# Patient Record
Sex: FEMALE | Race: WHITE | NOT HISPANIC OR LATINO | Employment: FULL TIME | URBAN - METROPOLITAN AREA
[De-identification: names, ages, dates, MRNs, and addresses within clinical notes are randomized per-mention and may not be internally consistent; named-entity substitution may affect disease eponyms.]

---

## 2017-03-29 DIAGNOSIS — I10 ESSENTIAL HYPERTENSION WITH GOAL BLOOD PRESSURE LESS THAN 140/90: ICD-10-CM

## 2017-03-29 RX ORDER — LISINOPRIL 10 MG/1
10 TABLET ORAL DAILY
Qty: 90 TABLET | Refills: 1 | Status: SHIPPED | OUTPATIENT
Start: 2017-03-29 | End: 2017-09-24

## 2017-04-04 ENCOUNTER — MYC MEDICAL ADVICE (OUTPATIENT)
Dept: FAMILY MEDICINE | Facility: CLINIC | Age: 57
End: 2017-04-04

## 2017-05-02 ENCOUNTER — OFFICE VISIT (OUTPATIENT)
Dept: FAMILY MEDICINE | Facility: CLINIC | Age: 57
End: 2017-05-02
Payer: COMMERCIAL

## 2017-05-02 ENCOUNTER — OFFICE VISIT (OUTPATIENT)
Dept: OPTOMETRY | Facility: CLINIC | Age: 57
End: 2017-05-02
Payer: COMMERCIAL

## 2017-05-02 VITALS
SYSTOLIC BLOOD PRESSURE: 165 MMHG | TEMPERATURE: 97.5 F | HEART RATE: 76 BPM | HEIGHT: 64 IN | WEIGHT: 205 LBS | BODY MASS INDEX: 35 KG/M2 | DIASTOLIC BLOOD PRESSURE: 79 MMHG

## 2017-05-02 DIAGNOSIS — H57.11 PAIN AROUND RIGHT EYE: Primary | ICD-10-CM

## 2017-05-02 DIAGNOSIS — H04.123 DRY EYES: ICD-10-CM

## 2017-05-02 DIAGNOSIS — H15.102 EPISCLERITIS OF LEFT EYE: Primary | ICD-10-CM

## 2017-05-02 PROCEDURE — 99203 OFFICE O/P NEW LOW 30 MIN: CPT | Performed by: OPTOMETRIST

## 2017-05-02 PROCEDURE — 99213 OFFICE O/P EST LOW 20 MIN: CPT | Performed by: FAMILY MEDICINE

## 2017-05-02 RX ORDER — PREDNISOLONE ACETATE 10 MG/ML
1 SUSPENSION/ DROPS OPHTHALMIC 4 TIMES DAILY
Qty: 1 BOTTLE | Refills: 0 | Status: SHIPPED | OUTPATIENT
Start: 2017-05-02 | End: 2017-06-07

## 2017-05-02 RX ORDER — PREDNISOLONE ACETATE 10 MG/ML
1 SUSPENSION/ DROPS OPHTHALMIC 4 TIMES DAILY
Qty: 1 BOTTLE | Refills: 0 | Status: SHIPPED | OUTPATIENT
Start: 2017-05-02 | End: 2017-05-02

## 2017-05-02 ASSESSMENT — VISUAL ACUITY
OD_SC: 20/30
OS_SC+: -2
OD_SC+: -1
OS_SC: 20/30
METHOD: SNELLEN - LINEAR

## 2017-05-02 ASSESSMENT — TONOMETRY
OD_IOP_MMHG: 17
OS_IOP_MMHG: 14
IOP_METHOD: APPLANATION

## 2017-05-02 ASSESSMENT — EXTERNAL EXAM - LEFT EYE: OS_EXAM: NORMAL

## 2017-05-02 ASSESSMENT — EXTERNAL EXAM - RIGHT EYE: OD_EXAM: NORMAL

## 2017-05-02 ASSESSMENT — SLIT LAMP EXAM - LIDS: COMMENTS: NORMAL

## 2017-05-02 NOTE — MR AVS SNAPSHOT
After Visit Summary   5/2/2017    Josephine Heard    MRN: [id number]           Patient Information     Date Of Birth          1960        Visit Information        Provider Department      5/2/2017 11:30 AM Michelle Fulton OD [facility]        Today's Diagnoses     Episcleritis of left eye    -  1      Care Instructions    Patient was advised of today's exam findings.  Use prescription drops four times a day for 7 days in left eye  Use artificial tears Systane Ultra twice a day   Return to clinic 1 week or as needed     Michelle Fulton O.D.  [facility]   [address] [place] [address]  [phone]          Follow-ups after your visit        Who to contact     If you have questions or need follow up information about today's clinic visit or your schedule please contact [facility] directly at [phone].  Normal or non-critical lab and imaging results will be communicated to you by MyChart, letter or phone within 4 business days after the clinic has received the results. If you do not hear from us within 7 days, please contact the clinic through MyChart or phone. If you have a critical or abnormal lab result, we will notify you by phone as soon as possible.  Submit refill requests through needmade or call your pharmacy and they will forward the refill request to us. Please allow 3 business days for your refill to be completed.          Additional Information About Your Visit        MyChart Information     needmade gives you secure access to your electronic health record. If you see a primary care provider, you can also send messages to your care team and make appointments. If you have questions, please call your primary care clinic.  If you do not have a primary care provider, please call [phone] and they will assist you.        Care EveryWhere ID     This is your Care EveryWhere ID. This could be used by other organizations to access your  [place] medical records  [id number]         Blood Pressure from Last 3 Encounters:   05/02/17 165/79   09/12/16 139/88   02/26/16 134/82    Weight from Last 3 Encounters:   05/02/17 93 kg (205 lb)   09/12/16 93.9 kg (207 lb)   02/26/16 93.9 kg (207 lb)              Today, you had the following     No orders found for display         Today's Medication Changes          These changes are accurate as of: 5/2/17 12:19 PM.  If you have any questions, ask your nurse or doctor.               Start taking these medicines.        Dose/Directions    prednisoLONE acetate 1 % ophthalmic susp   Commonly known as:  PRED FORTE   Used for:  Episcleritis of left eye   Started by:  Michelle Fulton, OD        Dose:  1 drop   Place 1 drop Into the left eye 4 times daily for 7 days   Quantity:  1 Bottle   Refills:  0            Where to get your medicines      Some of these will need a paper prescription and others can be bought over the counter.  Ask your nurse if you have questions.     Bring a paper prescription for each of these medications     prednisoLONE acetate 1 % ophthalmic susp                Primary Care Provider Office Phone # Fax #    Greer Cody [phone]       [facility] [address] [facility] [address]        Thank you!     Thank you for choosing [facility]  for your care. Our goal is always to provide you with excellent care. Hearing back from our patients is one way we can continue to improve our services. Please take a few minutes to complete the written survey that you may receive in the mail after your visit with us. Thank you!             Your Updated Medication List - Protect others around you: Learn how to safely use, store and throw away your medicines at www.disposemymeds.org.          This list is accurate as of: 5/2/17 12:19 PM.  Always use your most recent med list.                   Brand Name Dispense Instructions for use    ALPRAZolam 0.25 MG  tablet    XANAX     1 TABLET 3 TIMES DAILY as needed       aspirin 81 MG tablet      1 TABLET DAILY       Black Cohosh 40 MG Caps      Take  by mouth.       CALCIUM 600 + D PO      ONCE A DAY       cetirizine 10 MG tablet    zyrTEC     Take 10 mg by mouth daily       EVENING PRIMROSE OIL PO      Take  by mouth.       FISH OIL      ONCE A DAY       fluticasone 50 MCG/ACT spray    FLONASE    3 Package    Spray 1-2 sprays into both nostrils daily       hyoscyamine 0.125 MG tablet    ANASPAZ/LEVSIN    40 tablet    Take 1-2 tablets by mouth every 4 hours as needed for cramping.       ibuprofen 200 MG tablet    ADVIL/MOTRIN     1-2 TABLETS on as needed basis       lisinopril 10 MG tablet    PRINIVIL/ZESTRIL    90 tablet    Take 1 tablet (10 mg) by mouth daily       MECLIZINE HCL PO      AS NEEDED       MIRALAX PO      AS NEEDED       olopatadine 0.1 % ophthalmic solution    PATANOL    5 mL    Place 1 drop into both eyes 2 times daily       omeprazole 20 MG CR capsule    priLOSEC    180 capsule    Take 1 capsule (20 mg) by mouth 2 times daily       prednisoLONE acetate 1 % ophthalmic susp    PRED FORTE    1 Bottle    Place 1 drop Into the left eye 4 times daily for 7 days       * triamcinolone 0.1 % cream    KENALOG    15 g    Apply sparingly to affected area three times daily for 14 days.       * triamcinolone 0.1 % cream    KENALOG    30 g    Apply sparingly to face bid times daily for 14 days.       VITAMIN D (CHOLECALCIFEROL) PO      Take 1,000 Units by mouth daily       * Notice:  This list has 2 medication(s) that are the same as other medications prescribed for you. Read the directions carefully, and ask your doctor or other care provider to review them with you.

## 2017-05-02 NOTE — PROGRESS NOTES
"  SUBJECTIVE:                                                    Josephine Heard is a 56 year old female who presents to clinic today for the following health issues:        Left eye is red and sore, watery started yesterday.    Pt with blowing leaves on Saturday.  48 hours later left eye started getting painful,   Feels like pressure in eye  No discharge.   Has tried multiple eye drops.   Vision is a bit blury.    Hurts with looking to the left.     Right eye is fine  Given nature of symptoms, pt referred to optho for further evaluation    Problem list and histories reviewed & adjusted, as indicated.  Additional history: as documented    Labs reviewed in EPIC    Reviewed and updated as needed this visit by clinical staff  Tobacco  Allergies  Med Hx  Surg Hx  Fam Hx  Soc Hx      Reviewed and updated as needed this visit by Provider         ROS:  Constitutional, HEENT, cardiovascular, pulmonary, gi and gu systems are negative, except as otherwise noted.    OBJECTIVE:                                                    /79  Pulse 76  Temp 97.5  F (36.4  C) (Oral)  Ht 5' 4\" (1.626 m)  Wt 205 lb (93 kg)  BMI 35.19 kg/m2  Body mass index is 35.19 kg/(m^2).  GENERAL: healthy, alert and no distress  EYES: Eyes grossly normal to inspection, PERRL, EOMI and conjunctiva/corneas- conjunctival injection OD    Diagnostic Test Results:  none      ASSESSMENT/PLAN:                                                            1. Pain around right eye  As above, to see optho today          Greer Herrmann MD  [facility]    "

## 2017-05-02 NOTE — PROGRESS NOTES
5/2/2017 4:33 PM resent prescription since original was local print by mistake.  Michelle Fulton OD

## 2017-05-02 NOTE — MR AVS SNAPSHOT
"              After Visit Summary   5/2/2017    Josephine Heard    MRN: [id number]           Patient Information     Date Of Birth          1960        Visit Information        Provider Department      5/2/2017 11:00 AM Greer Cody MD [facility]        Today's Diagnoses     Pain around right eye    -  1       Follow-ups after your visit        Who to contact     If you have questions or need follow up information about today's clinic visit or your schedule please contact [facility] directly at [phone].  Normal or non-critical lab and imaging results will be communicated to you by MyChart, letter or phone within 4 business days after the clinic has received the results. If you do not hear from us within 7 days, please contact the clinic through JumpSoftt or phone. If you have a critical or abnormal lab result, we will notify you by phone as soon as possible.  Submit refill requests through FreshPay or call your pharmacy and they will forward the refill request to us. Please allow 3 business days for your refill to be completed.          Additional Information About Your Visit        MyChart Information     FreshPay gives you secure access to your electronic health record. If you see a primary care provider, you can also send messages to your care team and make appointments. If you have questions, please call your primary care clinic.  If you do not have a primary care provider, please call [phone] and they will assist you.        Care EveryWhere ID     This is your Care EveryWhere ID. This could be used by other organizations to access your [place] medical records  [id number]        Your Vitals Were     Pulse Temperature Height BMI (Body Mass Index)          76 97.5  F (36.4  C) (Oral) 5' 4\" (1.626 m) 35.19 kg/m2         Blood Pressure from Last 3 Encounters:   05/02/17 165/79   09/12/16 139/88   02/26/16 134/82    Weight from Last 3 Encounters:   05/02/17 205 lb (93 kg) "   09/12/16 207 lb (93.9 kg)   02/26/16 207 lb (93.9 kg)              Today, you had the following     No orders found for display         Today's Medication Changes          These changes are accurate as of: 5/2/17 10:10 PM.  If you have any questions, ask your nurse or doctor.               Start taking these medicines.        Dose/Directions    polyethylene glycol 0.4%- propylene glycol 0.3% 0.4-0.3 % Soln ophthalmic solution   Commonly known as:  SYSTANE ULTRA   Used for:  Dry eyes   Started by:  Michelle Fulton, OD        Dose:  1 drop   Place 1 drop into both eyes 2 times daily   Refills:  0       prednisoLONE acetate 1 % ophthalmic susp   Commonly known as:  PRED FORTE   Used for:  Episcleritis of left eye   Started by:  Michelle Fulton, OD        Dose:  1 drop   Place 1 drop Into the left eye 4 times daily   Quantity:  1 Bottle   Refills:  0            Where to get your medicines      These medications were sent to [facility] [address] [place] [address] [address] [facility] [address] [address]     Phone:  [phone]     prednisoLONE acetate 1 % ophthalmic susp         Some of these will need a paper prescription and others can be bought over the counter.  Ask your nurse if you have questions.     You don't need a prescription for these medications     polyethylene glycol 0.4%- propylene glycol 0.3% 0.4-0.3 % Soln ophthalmic solution                Primary Care Provider Office Phone # Fax #    Greer Cody [phone]       [facility] [address] [facility] [address]        Thank you!     Thank you for choosing [facility]  for your care. Our goal is always to provide you with excellent care. Hearing back from our patients is one way we can continue to improve our services. Please take a few minutes to complete the written survey that you may receive in the mail after your visit with us.  Thank you!             Your Updated Medication List - Protect others around you: Learn how to safely use, store and throw away your medicines at www.disposemymeds.org.          This list is accurate as of: 5/2/17 10:10 PM.  Always use your most recent med list.                   Brand Name Dispense Instructions for use    ALPRAZolam 0.25 MG tablet    XANAX     1 TABLET 3 TIMES DAILY as needed       aspirin 81 MG tablet      1 TABLET DAILY       Black Cohosh 40 MG Caps      Take  by mouth.       CALCIUM 600 + D PO      ONCE A DAY       cetirizine 10 MG tablet    zyrTEC     Take 10 mg by mouth daily       EVENING PRIMROSE OIL PO      Take  by mouth.       FISH OIL      ONCE A DAY       fluticasone 50 MCG/ACT spray    FLONASE    3 Package    Spray 1-2 sprays into both nostrils daily       hyoscyamine 0.125 MG tablet    ANASPAZ/LEVSIN    40 tablet    Take 1-2 tablets by mouth every 4 hours as needed for cramping.       ibuprofen 200 MG tablet    ADVIL/MOTRIN     1-2 TABLETS on as needed basis       lisinopril 10 MG tablet    PRINIVIL/ZESTRIL    90 tablet    Take 1 tablet (10 mg) by mouth daily       MECLIZINE HCL PO      AS NEEDED       MIRALAX PO      AS NEEDED       olopatadine 0.1 % ophthalmic solution    PATANOL    5 mL    Place 1 drop into both eyes 2 times daily       omeprazole 20 MG CR capsule    priLOSEC    180 capsule    Take 1 capsule (20 mg) by mouth 2 times daily       polyethylene glycol 0.4%- propylene glycol 0.3% 0.4-0.3 % Soln ophthalmic solution    SYSTANE ULTRA     Place 1 drop into both eyes 2 times daily       prednisoLONE acetate 1 % ophthalmic susp    PRED FORTE    1 Bottle    Place 1 drop Into the left eye 4 times daily       * triamcinolone 0.1 % cream    KENALOG    15 g    Apply sparingly to affected area three times daily for 14 days.       * triamcinolone 0.1 % cream    KENALOG    30 g    Apply sparingly to face bid times daily for 14 days.       VITAMIN D (CHOLECALCIFEROL) PO      Take 1,000  Units by mouth daily       * Notice:  This list has 2 medication(s) that are the same as other medications prescribed for you. Read the directions carefully, and ask your doctor or other care provider to review them with you.

## 2017-05-02 NOTE — NURSING NOTE
"Chief Complaint   Patient presents with     Eye Problem       Initial /79  Pulse 76  Temp 97.5  F (36.4  C) (Oral)  Ht 5' 4\" (1.626 m)  Wt 205 lb (93 kg)  BMI 35.19 kg/m2 Estimated body mass index is 35.19 kg/(m^2) as calculated from the following:    Height as of this encounter: 5' 4\" (1.626 m).    Weight as of this encounter: 205 lb (93 kg).  Medication Reconciliation: complete   Tatiana Rivas MA      "

## 2017-05-02 NOTE — PROGRESS NOTES
Chief Complaint   Patient presents with     Eye Problem Left Eye     eye pain, per Dr Cody        HPI    Affected eye(s):  Left   Symptoms:     Redness   No foreign body sensation   No photophobia      Duration:  2 days   Frequency:  Constant       Do you have eye pain now?:  Yes   Location:  OS   Pain Level:  No Pain (1)   Pain Duration:  2 days   Other:  dull ache      Comments:  Patient was just seen in family practice for what she thought was pink eye. Dr Cody wanted her to be seen in the eye dept for eye pain and pressure behind her eye.   Her left  eye is sore, and that when she turns her eye to the left it's more sore. Feels pressure in back of eye when touching the left eye  She does not wear contact lenses, and she did put some Visine in her eye about an hour ago- took some of redness away. She said that she also used an [place]away allergy drop . This morning whole eye was red before Visine use, yesterday just the nasal corner    Sinus issues tend to be on left side, has history of blockage of left lower puncta     Using Claritin for Spring allergies           \      HPI and ROS performed by Michelle Fulton, OD  scribed by Chastity Chow Optometric Assistant       See Review Of Systems     Medical, surgical and family histories reviewed and updated 5/2/2017.         OBJECTIVE: See Ophthalmology exam    ASSESSMENT:    ICD-10-CM    1. Episcleritis of left eye H15.102 prednisoLONE acetate (PRED FORTE) 1 % ophthalmic susp   2. Dry eyes H04.123 polyethylene glycol 0.4%- propylene glycol 0.3% (SYSTANE ULTRA) 0.4-0.3 % SOLN ophthalmic solution      PLAN:    Patient Instructions   Patient was advised of today's exam findings.  Use prescription drops four times a day for 7 days in left eye  Use artificial tears Systane Ultra twice a day   Return to clinic 1 week or as needed     Michelle Fulton O.D.  [facility]   [id number] [place] [id number]  [phone]

## 2017-05-02 NOTE — PATIENT INSTRUCTIONS
Patient was advised of today's exam findings.  Use prescription drops four times a day for 7 days in left eye  Use artificial tears Systane Ultra twice a day   Return to clinic 1 week or as needed     Michelle Fulton O.D.  [facility]   [address] [place], [place] [id number] [phone]

## 2017-05-19 DIAGNOSIS — K21.9 GASTROESOPHAGEAL REFLUX DISEASE WITHOUT ESOPHAGITIS: ICD-10-CM

## 2017-05-22 DIAGNOSIS — K21.9 GASTROESOPHAGEAL REFLUX DISEASE WITHOUT ESOPHAGITIS: ICD-10-CM

## 2017-06-07 ENCOUNTER — OFFICE VISIT (OUTPATIENT)
Dept: FAMILY MEDICINE | Facility: CLINIC | Age: 57
End: 2017-06-07
Payer: COMMERCIAL

## 2017-06-07 VITALS
BODY MASS INDEX: 34.66 KG/M2 | OXYGEN SATURATION: 96 % | WEIGHT: 203 LBS | HEIGHT: 64 IN | DIASTOLIC BLOOD PRESSURE: 78 MMHG | HEART RATE: 84 BPM | SYSTOLIC BLOOD PRESSURE: 132 MMHG | TEMPERATURE: 97.7 F

## 2017-06-07 DIAGNOSIS — M54.6 ACUTE RIGHT-SIDED THORACIC BACK PAIN: Primary | ICD-10-CM

## 2017-06-07 PROCEDURE — 99213 OFFICE O/P EST LOW 20 MIN: CPT | Performed by: PHYSICIAN ASSISTANT

## 2017-06-07 NOTE — NURSING NOTE
"Chief Complaint   Patient presents with     Back Pain       Initial /90  Pulse 84  Temp 97.7  F (36.5  C) (Oral)  Ht 5' 4\" (1.626 m)  Wt 203 lb (92.1 kg)  SpO2 96%  BMI 34.84 kg/m2 Estimated body mass index is 34.84 kg/(m^2) as calculated from the following:    Height as of this encounter: 5' 4\" (1.626 m).    Weight as of this encounter: 203 lb (92.1 kg).  Medication Reconciliation: complete  Mattie Tang M.A.    "

## 2017-06-07 NOTE — PATIENT INSTRUCTIONS
Rest the back. No heavy lifting or straining until your pain has resolved.     May slowly increased activities as tolerated. If something causes you pain, you are doing too much.    Alternate motrin and tylenol as needed for discomfort.   Motrin (ibuprofen) 200mg over the counter tablets - 3 tablets every 6 hours as needed.   Tylenol (acetaminophen) 325mg over the counter tablets - 2 tablets every 4-6 hours as needed.     Apply ice multiple times daily for the first 2-3 days. Then, alternate ice and heat multiple times daily. Ice will help to decrease swelling and pain. Heat will help to relax the muscles.    Follow up with physical therapy for evaluation and treatment of your back pain.    Follow up with primary care provider in 2-3 weeks if no improvement of symptoms. Sooner if any worsening of symptoms.    Go to the Emergency Department if any weakness, numbness, loss of control of your bowel or bladder, severe worsening pain, or any other concerning symptoms.

## 2017-06-07 NOTE — PROGRESS NOTES
"  SUBJECTIVE:                                                    Josephine Heard is a 56 year old female who presents to clinic today for the following health issues:        Back Pain      Duration: 2 weeks        Specific cause: lifting - felt a \"pop\" and pain when trying to lift her 30 lb dog    Description:   Location of pain: right flank area  Character of pain: sharp and dull ache  Pain radiation: follows along the rib to the lateral right abdomen  New numbness or weakness in legs, not attributed to pain:  no     Intensity: Currently 4/10    History:   Pain interferes with job: No  History of back problems: no prior back problems  Any previous MRI or X-rays: None  Sees a specialist for back pain:  No  Therapies tried without relief: heat and NSAIDs    Alleviating factors:   Improved by: slight relief      Precipitating factors:  Worsened by: Bending and twisting    Functional and Psychosocial Screen (Rosa STarT Back):      Not performed today       Accompanying Signs & Symptoms:  Risk of Fracture:  None  Risk of Cauda Equina:  None  Risk of Infection:  None  Risk of Cancer:  None  Risk of Ankylosing Spondylitis:  Onset at age <35, male, AND morning back stiffness. no                  Locates pain to the right flank area. Pain is slowly improving. Pain is worse with any movement of the right arm. She is right hand dominant. Deep breathing, cough, and sneezing do not worsen the pain. Only certain movements with the back and right arm worsen the pain.         Problem list and histories reviewed & adjusted, as indicated.  Additional history: none    Patient Active Problem List   Diagnosis     Colloid cyst of third ventricle (H)     Anxiety     Fatty liver     Seasonal allergies     CARDIOVASCULAR SCREENING; LDL GOAL LESS THAN 130     GERD (gastroesophageal reflux disease)     Shingles     Health Care Home     RUQ abdominal pain     Vitamin D deficiency     Obesity     HCD (health care directive)     Essential " hypertension with goal blood pressure less than 140/90     Non morbid obesity, unspecified obesity type     Past Surgical History:   Procedure Laterality Date     BIOPSY       COLONOSCOPY  2/19/2013    Procedure: COLONOSCOPY;  Colonoscopy, screening;  Surgeon: Gaetano Saravia MD;  Location: [facility] OR     HC LAPAROSCOPIC MYOMECTOMY, 1 - 4 INTRAMURAL MYOMAS =<250 GM  89     HC REMOVE TONSILS/ADENOIDS,<13 Y/O       HYSTERECTOMY, PAP NO LONGER INDICATED       HYSTERECTOMY, NADER  07    secondary to menorrhagia     TUBAL/ECTOPIC PREGNANCY  1994/1995       Social History   Substance Use Topics     Smoking status: Former Smoker     Types: Cigarettes     Quit date: 1/24/2012     Smokeless tobacco: Never Used     Alcohol use Yes      Comment: GLASS OF WINE EVERY NIGHT     Family History   Problem Relation Age of Onset     Eye Disorder Mother      GLAUCOMA     GASTROINTESTINAL DISEASE Mother      TWISTED BOWEL     Gynecology Mother      UTERINE CA /BREAST     Neurologic Disorder Mother      Migraines     Breast Cancer Mother      Glaucoma Mother      DIABETES Father      Eye Disorder Maternal Grandmother      GLAUCOMA     CEREBROVASCULAR DISEASE Maternal Grandmother      Glaucoma Maternal Grandmother      DIABETES Maternal Grandfather      CEREBROVASCULAR DISEASE Maternal Grandfather      Blood Disease Sister      HEPATITIS C     Unknown/Adopted Sister      Unknown/Adopted Sister          Current Outpatient Prescriptions   Medication Sig Dispense Refill     lisinopril (PRINIVIL/ZESTRIL) 10 MG tablet Take 1 tablet (10 mg) by mouth daily 90 tablet 1     omeprazole (PRILOSEC) 20 MG capsule Take 1 capsule (20 mg) by mouth 2 times daily 180 capsule 3     cetirizine (ZYRTEC) 10 MG tablet Take 10 mg by mouth daily       VITAMIN D, CHOLECALCIFEROL, PO Take 1,000 Units by mouth daily       fluticasone (FLONASE) 50 MCG/ACT nasal spray Spray 1-2 sprays into both nostrils daily 3 Package 1     Black Cohosh 40 MG CAPS Take  by mouth.        "EVENING PRIMROSE OIL PO Take  by mouth.       hyoscyamine (ANASPAZ,LEVSIN) 0.125 MG tablet Take 1-2 tablets by mouth every 4 hours as needed for cramping. 40 tablet 1     ALPRAZOLAM 0.25 MG PO TABS 1 TABLET 3 TIMES DAILY as needed       ASPIRIN 81 MG OR TABS 1 TABLET DAILY       IBUPROFEN 200 MG OR TABS 1-2 TABLETS on as needed basis       MECLIZINE HCL OR AS NEEDED       CALCIUM 600 + D OR ONCE A DAY       FISH OIL ONCE A DAY       MIRALAX OR AS NEEDED       Allergies   Allergen Reactions     Contrast Dye      BP Readings from Last 3 Encounters:   06/07/17 140/90   05/02/17 165/79   09/12/16 139/88    Wt Readings from Last 3 Encounters:   06/07/17 203 lb (92.1 kg)   05/02/17 205 lb (93 kg)   09/12/16 207 lb (93.9 kg)                    Reviewed and updated as needed this visit by clinical staff       Reviewed and updated as needed this visit by Provider         ROS:  Constitutional, cardiovascular, pulmonary, gi and gu and musculoskeletal systems are negative, except as otherwise noted.    OBJECTIVE:                                                    /90  Pulse 84  Temp 97.7  F (36.5  C) (Oral)  Ht 5' 4\" (1.626 m)  Wt 203 lb (92.1 kg)  SpO2 96%  BMI 34.84 kg/m2  Body mass index is 34.84 kg/(m^2).  GENERAL: healthy, alert and no distress  RESP: lungs clear to auscultation - no rales, rhonchi or wheezes  CV: regular rate and rhythm, normal S1 S2, no S3 or S4, no murmur, click or rub, no peripheral edema and peripheral pulses strong  MS: no gross musculoskeletal defects noted, no edema  SKIN: no suspicious lesions or rashes  Comprehensive back pain exam:  Tenderness of right paraspinal muscles in the thoracic region, minimal spasm palpated, no bony tenderness, Range of motion not limited by pain, Lower extremity strength functional and equal on both sides, Lower extremity reflexes within normal limits bilaterally and Lower extremity sensation normal and equal on both sides    Diagnostic Test Results:  none "      ASSESSMENT/PLAN:                                                        ICD-10-CM    1. Acute right-sided thoracic back pain M54.6 MAGGI PT, HAND, AND CHIROPRACTIC REFERRAL       Patient Instructions   Rest the back. No heavy lifting or straining until your pain has resolved.     May slowly increased activities as tolerated. If something causes you pain, you are doing too much.    Alternate motrin and tylenol as needed for discomfort.   Motrin (ibuprofen) 200mg over the counter tablets - 3 tablets every 6 hours as needed.   Tylenol (acetaminophen) 325mg over the counter tablets - 2 tablets every 4-6 hours as needed.     Apply ice multiple times daily for the first 2-3 days. Then, alternate ice and heat multiple times daily. Ice will help to decrease swelling and pain. Heat will help to relax the muscles.    Follow up with physical therapy for evaluation and treatment of your back pain.    Follow up with primary care provider in 2-3 weeks if no improvement of symptoms. Sooner if any worsening of symptoms.    Go to the Emergency Department if any weakness, numbness, loss of control of your bowel or bladder, severe worsening pain, or any other concerning symptoms.         Joanna Acosta PA-C  [facility]

## 2017-06-07 NOTE — MR AVS SNAPSHOT
After Visit Summary   6/7/2017    Josephine Heard    MRN: [id number]           Patient Information     Date Of Birth          1960        Visit Information        Provider Department      6/7/2017 1:40 PM Joanna Acosta PA-C [facility]        Today's Diagnoses     Acute right-sided thoracic back pain    -  1      Care Instructions    Rest the back. No heavy lifting or straining until your pain has resolved.     May slowly increased activities as tolerated. If something causes you pain, you are doing too much.    Alternate motrin and tylenol as needed for discomfort.   Motrin (ibuprofen) 200mg over the counter tablets - 3 tablets every 6 hours as needed.   Tylenol (acetaminophen) 325mg over the counter tablets - 2 tablets every 4-6 hours as needed.     Apply ice multiple times daily for the first 2-3 days. Then, alternate ice and heat multiple times daily. Ice will help to decrease swelling and pain. Heat will help to relax the muscles.    Follow up with physical therapy for evaluation and treatment of your back pain.    Follow up with primary care provider in 2-3 weeks if no improvement of symptoms. Sooner if any worsening of symptoms.    Go to the Emergency Department if any weakness, numbness, loss of control of your bowel or bladder, severe worsening pain, or any other concerning symptoms.             Follow-ups after your visit        Additional Services     MAGGI PT, HAND, AND CHIROPRACTIC REFERRAL       **This order will print in the [facility] Scheduling Office**    Physical Therapy, Hand Therapy and Chiropractic Care are available through:    *[facility]  *[facility]  *[facility]    Call one number to schedule at any of the above locations: [phone].    Your provider has referred you to: Physical Therapy at [facility] or [facility]    Indication/Reason for Referral: right thoracic back pain / muscle strain  Onset of Illness: 2  weeks  Therapy Orders: Evaluate and Treat  Special Programs: None  Special Request: Manual Therapy: Myofascial Release/Massage    Rosa    Rosa      Additional Comments for the Therapist or Chiropractor: none    Please be aware that coverage of these services is subject to the terms and limitations of your health insurance plan.  Call member services at your health plan with any benefit or coverage questions.      Please bring the following to your appointment:    *Your personal calendar for scheduling future appointments  *Comfortable clothing                  Who to contact     If you have questions or need follow up information about today's clinic visit or your schedule please contact [facility] directly at [phone].  Normal or non-critical lab and imaging results will be communicated to you by [employer], letter or phone within 4 business days after the clinic has received the results. If you do not hear from us within 7 days, please contact the clinic through [employer] or phone. If you have a critical or abnormal lab result, we will notify you by phone as soon as possible.  Submit refill requests through [employer] or call your pharmacy and they will forward the refill request to us. Please allow 3 business days for your refill to be completed.          Additional Information About Your Visit        [employer] Information     [employer] gives you secure access to your electronic health record. If you see a primary care provider, you can also send messages to your care team and make appointments. If you have questions, please call your primary care clinic.  If you do not have a primary care provider, please call [phone] and they will assist you.        Care EveryWhere ID     This is your Care EveryWhere ID. This could be used by other organizations to access your [place] medical records  [id number]        Your Vitals Were     Pulse Temperature Height Pulse Oximetry BMI (Body Mass Index)       84  "97.7  F (36.5  C) (Oral) 5' 4\" (1.626 m) 96% 34.84 kg/m2        Blood Pressure from Last 3 Encounters:   06/07/17 140/90   05/02/17 165/79   09/12/16 139/88    Weight from Last 3 Encounters:   06/07/17 203 lb (92.1 kg)   05/02/17 205 lb (93 kg)   09/12/16 207 lb (93.9 kg)              We Performed the Following     MAGGI PT, HAND, AND CHIROPRACTIC REFERRAL        Primary Care Provider Office Phone # Fax #    Greer Cody [phone]       [facility] [address] [facility] [address]        Thank you!     Thank you for choosing [facility]  for your care. Our goal is always to provide you with excellent care. Hearing back from our patients is one way we can continue to improve our services. Please take a few minutes to complete the written survey that you may receive in the mail after your visit with us. Thank you!             Your Updated Medication List - Protect others around you: Learn how to safely use, store and throw away your medicines at www.disposemymeds.org.          This list is accurate as of: 6/7/17  2:12 PM.  Always use your most recent med list.                   Brand Name Dispense Instructions for use    ALPRAZolam 0.25 MG tablet    XANAX     1 TABLET 3 TIMES DAILY as needed       aspirin 81 MG tablet      1 TABLET DAILY       Black Cohosh 40 MG Caps      Take  by mouth.       CALCIUM 600 + D PO      ONCE A DAY       cetirizine 10 MG tablet    zyrTEC     Take 10 mg by mouth daily       EVENING PRIMROSE OIL PO      Take  by mouth.       FISH OIL      ONCE A DAY       fluticasone 50 MCG/ACT spray    FLONASE    3 Package    Spray 1-2 sprays into both nostrils daily       hyoscyamine 0.125 MG tablet    ANASPAZ/LEVSIN    40 tablet    Take 1-2 tablets by mouth every 4 hours as needed for cramping.       ibuprofen 200 MG tablet    ADVIL/MOTRIN     1-2 TABLETS on as needed basis       lisinopril 10 MG tablet    PRINIVIL/ZESTRIL    90 tablet    Take 1 tablet " (10 mg) by mouth daily       MECLIZINE HCL PO      AS NEEDED       MIRALAX PO      AS NEEDED       omeprazole 20 MG CR capsule    priLOSEC    180 capsule    Take 1 capsule (20 mg) by mouth 2 times daily       VITAMIN D (CHOLECALCIFEROL) PO      Take 1,000 Units by mouth daily

## 2017-09-24 DIAGNOSIS — I10 ESSENTIAL HYPERTENSION WITH GOAL BLOOD PRESSURE LESS THAN 140/90: ICD-10-CM

## 2017-09-24 NOTE — LETTER
September 26, 2017    Josephine Heard  [address] [place] [id number]        Dear Josephine,       We recently received a refill request for lisinopril (PRINIVIL/ZESTRIL) 10 MG tablet.  We have refilled this for a one time 30 day supply only because you are due for a:    Physical/Hypertension office visit and fasting lab appointment      Please schedule this lab appointment 4-5 days prior to the office visit.     Please call at your earliest convenience so that there will not be a delay with your future refills.          Thank you,   Your [facility] Team/[facility]  [phone]

## 2017-09-26 DIAGNOSIS — K21.9 GASTROESOPHAGEAL REFLUX DISEASE WITHOUT ESOPHAGITIS: ICD-10-CM

## 2017-09-26 DIAGNOSIS — I10 ESSENTIAL HYPERTENSION WITH GOAL BLOOD PRESSURE LESS THAN 140/90: ICD-10-CM

## 2017-09-26 RX ORDER — LISINOPRIL 10 MG/1
TABLET ORAL
Qty: 90 TABLET | Refills: 0 | Status: SHIPPED | OUTPATIENT
Start: 2017-09-26 | End: 2017-11-12

## 2017-09-26 RX ORDER — LISINOPRIL 10 MG/1
10 TABLET ORAL DAILY
Qty: 30 TABLET | Refills: 0 | Status: SHIPPED | OUTPATIENT
Start: 2017-09-26 | End: 2017-09-26

## 2017-09-26 NOTE — TELEPHONE ENCOUNTER
Routing refill request to provider for review/approval because:  Patient is due for an appointment. RN can only send 30 day supply. Pharmacy is requesting 90 day supply.     Francheska Sanchez RN   [facility]

## 2017-09-26 NOTE — TELEPHONE ENCOUNTER
Refill sent for lisinopril. Patient needs lab and provider appointment for htn, also due for physical. Please send letter  .Yanna OLIVON, RN, CPN

## 2017-09-27 NOTE — TELEPHONE ENCOUNTER
Rx refilled per Adonis RN refill protocol.    TC, patient due for:  Physical and   Health Maintenance Due   Topic Date Due     LIPID MONITORING Q1 YEAR  08/19/2017     INFLUENZA VACCINE (SYSTEM ASSIGNED)  09/01/2017     Sandee Covington RN

## 2017-10-20 ENCOUNTER — TELEPHONE (OUTPATIENT)
Dept: FAMILY MEDICINE | Facility: CLINIC | Age: 57
End: 2017-10-20

## 2017-10-20 DIAGNOSIS — D22.9 ATYPICAL MOLE: Primary | ICD-10-CM

## 2017-10-26 ENCOUNTER — RADIANT APPOINTMENT (OUTPATIENT)
Dept: MAMMOGRAPHY | Facility: CLINIC | Age: 57
End: 2017-10-26
Payer: COMMERCIAL

## 2017-10-26 DIAGNOSIS — Z12.31 VISIT FOR SCREENING MAMMOGRAM: ICD-10-CM

## 2017-10-26 PROCEDURE — G0202 SCR MAMMO BI INCL CAD: HCPCS | Mod: TC

## 2017-11-03 ENCOUNTER — DOCUMENTATION ONLY (OUTPATIENT)
Dept: LAB | Facility: CLINIC | Age: 57
End: 2017-11-03

## 2017-11-03 DIAGNOSIS — Z13.6 CARDIOVASCULAR SCREENING; LDL GOAL LESS THAN 130: ICD-10-CM

## 2017-11-03 DIAGNOSIS — I10 ESSENTIAL HYPERTENSION WITH GOAL BLOOD PRESSURE LESS THAN 140/90: Primary | ICD-10-CM

## 2017-11-03 NOTE — PROGRESS NOTES
..Please place or confirm orders for upcoming lab appointment on 11.07.17    Thanks  Mackenzie Wray

## 2017-11-07 DIAGNOSIS — I10 ESSENTIAL HYPERTENSION WITH GOAL BLOOD PRESSURE LESS THAN 140/90: ICD-10-CM

## 2017-11-07 DIAGNOSIS — Z13.6 CARDIOVASCULAR SCREENING; LDL GOAL LESS THAN 130: ICD-10-CM

## 2017-11-07 LAB
ANION GAP SERPL CALCULATED.3IONS-SCNC: 9 MMOL/L (ref 3–14)
BUN SERPL-MCNC: 20 MG/DL (ref 7–30)
CALCIUM SERPL-MCNC: 8.8 MG/DL (ref 8.5–10.1)
CHLORIDE SERPL-SCNC: 105 MMOL/L (ref 94–109)
CHOLEST SERPL-MCNC: 177 MG/DL
CO2 SERPL-SCNC: 27 MMOL/L (ref 20–32)
CREAT SERPL-MCNC: 0.89 MG/DL (ref 0.52–1.04)
GFR SERPL CREATININE-BSD FRML MDRD: 66 ML/MIN/1.7M2
GLUCOSE SERPL-MCNC: 90 MG/DL (ref 70–99)
HDLC SERPL-MCNC: 52 MG/DL
LDLC SERPL CALC-MCNC: 106 MG/DL
NONHDLC SERPL-MCNC: 125 MG/DL
POTASSIUM SERPL-SCNC: 4.1 MMOL/L (ref 3.4–5.3)
SODIUM SERPL-SCNC: 141 MMOL/L (ref 133–144)
TRIGL SERPL-MCNC: 97 MG/DL

## 2017-11-07 PROCEDURE — 80048 BASIC METABOLIC PNL TOTAL CA: CPT | Performed by: FAMILY MEDICINE

## 2017-11-07 PROCEDURE — 80061 LIPID PANEL: CPT | Performed by: FAMILY MEDICINE

## 2017-11-07 PROCEDURE — 36415 COLL VENOUS BLD VENIPUNCTURE: CPT | Performed by: FAMILY MEDICINE

## 2017-11-14 ENCOUNTER — OFFICE VISIT (OUTPATIENT)
Dept: FAMILY MEDICINE | Facility: CLINIC | Age: 57
End: 2017-11-14
Payer: COMMERCIAL

## 2017-11-14 VITALS
TEMPERATURE: 97.6 F | SYSTOLIC BLOOD PRESSURE: 128 MMHG | HEART RATE: 77 BPM | WEIGHT: 202 LBS | DIASTOLIC BLOOD PRESSURE: 72 MMHG | BODY MASS INDEX: 34.67 KG/M2

## 2017-11-14 DIAGNOSIS — I10 ESSENTIAL HYPERTENSION WITH GOAL BLOOD PRESSURE LESS THAN 140/90: ICD-10-CM

## 2017-11-14 DIAGNOSIS — Z00.00 ROUTINE GENERAL MEDICAL EXAMINATION AT A HEALTH CARE FACILITY: Primary | ICD-10-CM

## 2017-11-14 DIAGNOSIS — E66.811 CLASS 1 OBESITY WITHOUT SERIOUS COMORBIDITY WITH BODY MASS INDEX (BMI) OF 34.0 TO 34.9 IN ADULT, UNSPECIFIED OBESITY TYPE: ICD-10-CM

## 2017-11-14 DIAGNOSIS — K21.9 GASTROESOPHAGEAL REFLUX DISEASE WITHOUT ESOPHAGITIS: ICD-10-CM

## 2017-11-14 PROCEDURE — 99396 PREV VISIT EST AGE 40-64: CPT | Performed by: FAMILY MEDICINE

## 2017-11-14 RX ORDER — LISINOPRIL 10 MG/1
TABLET ORAL
Qty: 90 TABLET | Refills: 1 | Status: SHIPPED | OUTPATIENT
Start: 2017-11-14 | End: 2018-06-24

## 2017-11-14 NOTE — MR AVS SNAPSHOT
After Visit Summary   11/14/2017    Josephine Heard    MRN: [id number]           Patient Information     Date Of Birth          1960        Visit Information        Provider Department      11/14/2017 8:00 AM Greer Cody MD [facility]        Today's Diagnoses     Routine general medical examination at a health care facility    -  1    Gastroesophageal reflux disease without esophagitis        Essential hypertension with goal blood pressure less than 140/90        Class 1 obesity without serious comorbidity with body mass index (BMI) of 34.0 to 34.9 in adult, unspecified obesity type          Care Instructions      Preventive Health Recommendations  Female Ages 50 - 64    Yearly exam: See your health care provider every year in order to  o Review health changes.   o Discuss preventive care.    o Review your medicines if your doctor has prescribed any.      Get a Pap test every three years (unless you have an abnormal result and your provider advises testing more often).    If you get Pap tests with HPV test, you only need to test every 5 years, unless you have an abnormal result.     You do not need a Pap test if your uterus was removed (hysterectomy) and you have not had cancer.    You should be tested each year for STDs (sexually transmitted diseases) if you're at risk.     Have a mammogram every 1 to 2 years.    Have a colonoscopy at age 50, or have a yearly FIT test (stool test). These exams screen for colon cancer.      Have a cholesterol test every 5 years, or more often if advised.    Have a diabetes test (fasting glucose) every three years. If you are at risk for diabetes, you should have this test more often.     If you are at risk for osteoporosis (brittle bone disease), think about having a bone density scan (DEXA).    Shots: Get a flu shot each year. Get a tetanus shot every 10 years.    Nutrition:     Eat at least 5 servings of fruits and vegetables each day.    Eat  whole-grain bread, whole-wheat pasta and brown rice instead of white grains and rice.    Talk to your provider about Calcium and Vitamin D.     Lifestyle    Exercise at least 150 minutes a week (30 minutes a day, 5 days a week). This will help you control your weight and prevent disease.    Limit alcohol to one drink per day.    No smoking.     Wear sunscreen to prevent skin cancer.     See your dentist every six months for an exam and cleaning.    See your eye doctor every 1 to 2 years.            Follow-ups after your visit        Who to contact     If you have questions or need follow up information about today's clinic visit or your schedule please contact [facility] directly at [phone].  Normal or non-critical lab and imaging results will be communicated to you by [employer], letter or phone within 4 business days after the clinic has received the results. If you do not hear from us within 7 days, please contact the clinic through [employer] or phone. If you have a critical or abnormal lab result, we will notify you by phone as soon as possible.  Submit refill requests through [employer] or call your pharmacy and they will forward the refill request to us. Please allow 3 business days for your refill to be completed.          Additional Information About Your Visit        [employer] Information     [employer] gives you secure access to your electronic health record. If you see a primary care provider, you can also send messages to your care team and make appointments. If you have questions, please call your primary care clinic.  If you do not have a primary care provider, please call [phone] and they will assist you.        Care EveryWhere ID     This is your Care EveryWhere ID. This could be used by other organizations to access your [place] medical records  [id number]        Your Vitals Were     Pulse Temperature BMI (Body Mass Index)             77 97.6  F (36.4  C) (Oral) 34.67 kg/m2           Blood Pressure from Last 3 Encounters:   11/14/17 128/72   06/07/17 132/78   05/02/17 165/79    Weight from Last 3 Encounters:   11/14/17 202 lb (91.6 kg)   06/07/17 203 lb (92.1 kg)   05/02/17 205 lb (93 kg)              Today, you had the following     No orders found for display         Today's Medication Changes          These changes are accurate as of: 11/14/17  9:25 AM.  If you have any questions, ask your nurse or doctor.               These medicines have changed or have updated prescriptions.        Dose/Directions    lisinopril 10 MG tablet   Commonly known as:  PRINIVIL/ZESTRIL   This may have changed:  See the new instructions.   Used for:  Essential hypertension with goal blood pressure less than 140/90   Changed by:  Greer Cody MD        TAKE 1 TABLET(10 MG) BY MOUTH DAILY   Quantity:  90 tablet   Refills:  1       omeprazole 20 MG CR capsule   Commonly known as:  priLOSEC   This may have changed:  See the new instructions.   Used for:  Gastroesophageal reflux disease without esophagitis   Changed by:  Greer Cody MD        Dose:  20 mg   Take 1 capsule (20 mg) by mouth 2 times daily   Quantity:  180 capsule   Refills:  3            Where to get your medicines      These [facility] [address] [place] [address] [address] [facility] AT [facility]  [address] [facility] [address]     Phone:  [phone]     lisinopril 10 MG tablet    omeprazole 20 MG CR capsule                Primary Care Provider Office Phone # Fax #    Greer Cody [phone] [address] [facility] [address]        Equal Access to Services     [facility] AH: Hadii javier gray Sosunil, waaxda luqadaha, qaybta kaalmada adeegyada, li streeter. [facility] [phone].    ATENCIÓN: Si habla español, tiene a duran disposición servicios gratuitos de asistencia lingüística. Llame al [phone].    We comply  with applicable federal civil rights laws and [place] laws. We do not discriminate on the basis of race, color, national origin, age, disability, sex, sexual orientation, or gender identity.            Thank you!     Thank you for choosing [facility]  for your care. Our goal is always to provide you with excellent care. Hearing back from our patients is one way we can continue to improve our services. Please take a few minutes to complete the written survey that you may receive in the mail after your visit with us. Thank you!             Your Updated Medication List - Protect others around you: Learn how to safely use, store and throw away your medicines at www.disposemymeds.org.          This list is accurate as of: 11/14/17  9:25 AM.  Always use your most recent med list.                   Brand Name Dispense Instructions for use Diagnosis    ALPRAZolam 0.25 MG tablet    XANAX     1 TABLET 3 TIMES DAILY as needed        aspirin 81 MG tablet      1 TABLET DAILY        Black Cohosh 40 MG Caps      Take  by mouth.        CALCIUM 600 + D PO      ONCE A DAY        cetirizine 10 MG tablet    zyrTEC     Take 10 mg by mouth daily        EVENING PRIMROSE OIL PO      Take  by mouth.        FISH OIL      ONCE A DAY        fluticasone 50 MCG/ACT spray    FLONASE    3 Package    Spray 1-2 sprays into both nostrils daily    Seasonal allergies       hyoscyamine 0.125 MG tablet    ANASPAZ/LEVSIN    40 tablet    Take 1-2 tablets by mouth every 4 hours as needed for cramping.    IBS (irritable colon syndrome), RUQ abdominal pain, Food intolerance       ibuprofen 200 MG tablet    ADVIL/MOTRIN     1-2 TABLETS on as needed basis        lisinopril 10 MG tablet    PRINIVIL/ZESTRIL    90 tablet    TAKE 1 TABLET(10 MG) BY MOUTH DAILY    Essential hypertension with goal blood pressure less than 140/90       MECLIZINE HCL PO      AS NEEDED        MIRALAX PO      AS NEEDED        omeprazole 20 MG CR capsule    priLOSEC    180  capsule    Take 1 capsule (20 mg) by mouth 2 times daily    Gastroesophageal reflux disease without esophagitis       VITAMIN D (CHOLECALCIFEROL) PO      Take 1,000 Units by mouth daily

## 2017-11-14 NOTE — PROGRESS NOTES
SUBJECTIVE:   CC: Josephine Heard is an 57 year old woman who presents for preventive health visit.     Physical   Annual:     Getting at least 3 servings of Calcium per day::  Yes    Bi-annual eye exam::  Yes    Dental care twice a year::  Yes    Sleep apnea or symptoms of sleep apnea::  None    Diet::  Regular (no restrictions)    Frequency of exercise::  6-7 days/week    Duration of exercise::  15-30 minutes    Taking medications regularly::  Yes    Medication side effects::  None    Additional concerns today::  YES   recent cold and was dizzy/ light headed  Lightheadedness has resolved with resolution of cold        Today's PHQ-2 Score:   PHQ-2 ( 1999 [employer]) 11/13/2017   Q1: Little interest or pleasure in doing things 0   Q2: Feeling down, depressed or hopeless 0   PHQ-2 Score 0   Q1: Little interest or pleasure in doing things Not at all   Q2: Feeling down, depressed or hopeless Not at all   PHQ-2 Score 0       Abuse: Current or Past(Physical, Sexual or Emotional)- No  Do you feel safe in your environment - Yes    Social History   Substance Use Topics     Smoking status: Former Smoker     Types: Cigarettes     Quit date: 1/24/2012     Smokeless tobacco: Never Used     Alcohol use Yes      Comment: GLASS OF WINE EVERY NIGHT     The patient does not drink >3 drinks per day nor >7 drinks per week.    Reviewed orders with patient.  Reviewed health maintenance and updated orders accordingly - Yes  Labs reviewed in [facility]    Patient over age 50, mutual decision to screen reflected in health maintenance.      Pertinent mammograms are reviewed under the imaging tab.  History of abnormal Pap smear: Status post hysterectomy. Pap still indicated. no    Reviewed and updated as needed this visit by clinical staffTobacco  Allergies  Meds  Med Hx  Surg Hx  Fam Hx  Soc Hx        Reviewed and updated as needed this visit by Provider            Review of Systems  C: NEGATIVE for fever, chills, change in weight  I: NEGATIVE  for worrisome rashes, moles or lesions  E: NEGATIVE for vision changes or irritation  ENT: NEGATIVE for ear, mouth and throat problems  R: NEGATIVE for significant cough or SOB  B: NEGATIVE for masses, tenderness or discharge  CV: NEGATIVE for chest pain, palpitations or peripheral edema  GI: NEGATIVE for nausea, abdominal pain, heartburn, or change in bowel habits  : NEGATIVE for unusual urinary or vaginal symptoms. No vaginal bleeding.  M: NEGATIVE for significant arthralgias or myalgia  N: NEGATIVE for weakness, dizziness or paresthesias  P: NEGATIVE for changes in mood or affect      OBJECTIVE:   /72  Pulse 77  Temp 97.6  F (36.4  C) (Oral)  Wt 202 lb (91.6 kg)  BMI 34.67 kg/m2  Physical Exam  GENERAL: healthy, alert and no distress  EYES: Eyes grossly normal to inspection, PERRL and conjunctivae and sclerae normal  HENT: ear canals and TM's normal, nose and mouth without ulcers or lesions  NECK: no adenopathy, no asymmetry, masses, or scars and thyroid normal to palpation  RESP: lungs clear to auscultation - no rales, rhonchi or wheezes  BREAST: normal without masses, tenderness or nipple discharge and no palpable axillary masses or adenopathy  CV: regular rate and rhythm, normal S1 S2, no S3 or S4, no murmur, click or rub, no peripheral edema and peripheral pulses strong  ABDOMEN: soft, nontender, no hepatosplenomegaly, no masses and bowel sounds normal  MS: no gross musculoskeletal defects noted, no edema  SKIN: no suspicious lesions or rashes  NEURO: Normal strength and tone, mentation intact and speech normal  PSYCH: mentation appears normal, affect normal/bright    ASSESSMENT/PLAN:   1. Routine general medical examination at a health care facility      2. Gastroesophageal reflux disease without esophagitis  Refill as is working well  - omeprazole (PRILOSEC) 20 MG CR capsule; Take 1 capsule (20 mg) by mouth 2 times daily  Dispense: 180 capsule; Refill: 0    3. Essential hypertension with goal  "blood pressure less than 140/90  At goal on med  - lisinopril (PRINIVIL/ZESTRIL) 10 MG tablet; TAKE 1 TABLET(10 MG) BY MOUTH DAILY  Dispense: 90 tablet; Refill: 1    4. Class 1 obesity without serious comorbidity with body mass index (BMI) of 34.0 to 34.9 in adult, unspecified obesity type  Encourage exercise and healthy diet      COUNSELING:  Reviewed preventive health counseling, as reflected in patient instructions       Regular exercise       Healthy diet/nutrition       Vision screening       Osteoporosis Prevention/Bone Health       Colon cancer screening         reports that she quit smoking about 5 years ago. Her smoking use included Cigarettes. She has never used smokeless tobacco.    Estimated body mass index is 34.67 kg/(m^2) as calculated from the following:    Height as of 6/7/17: 5' 4\" (1.626 m).    Weight as of this encounter: 202 lb (91.6 kg).   Weight management plan: Discussed healthy diet and exercise guidelines and patient will follow up in 12 months in clinic to re-evaluate.    Counseling Resources:  ATP IV Guidelines  Pooled Cohorts Equation Calculator  Breast Cancer Risk Calculator  FRAX Risk Assessment  ICSI Preventive Guidelines  Dietary Guidelines for Americans, 2010  [email or web address]'s MyPlate  ASA Prophylaxis  Lung CA Screening    Greer Herrmann MD  [facility]  Answers for HPI/ROS submitted by the patient on 11/13/2017   PHQ-2 Score: 0    "

## 2017-11-14 NOTE — NURSING NOTE
"Chief Complaint   Patient presents with     Physical       Initial /85  Pulse 77  Temp 97.6  F (36.4  C) (Oral)  Wt 202 lb (91.6 kg)  BMI 34.67 kg/m2 Estimated body mass index is 34.67 kg/(m^2) as calculated from the following:    Height as of 6/7/17: 5' 4\" (1.626 m).    Weight as of this encounter: 202 lb (91.6 kg).  Medication Reconciliation: dominick Rivas MA      "

## 2017-12-18 ENCOUNTER — MYC MEDICAL ADVICE (OUTPATIENT)
Dept: FAMILY MEDICINE | Facility: CLINIC | Age: 57
End: 2017-12-18

## 2017-12-18 DIAGNOSIS — R42 VERTIGO: Primary | ICD-10-CM

## 2017-12-18 RX ORDER — MECLIZINE HCL 12.5 MG 12.5 MG/1
25 TABLET ORAL 3 TIMES DAILY PRN
Qty: 90 TABLET | Refills: 0 | Status: SHIPPED | OUTPATIENT
Start: 2017-12-18

## 2017-12-18 NOTE — TELEPHONE ENCOUNTER
Cannot refill per RN protocol  Med is listed as historical.  To provider to advise.   PALLAVI BrownN RN

## 2018-01-24 ENCOUNTER — OFFICE VISIT (OUTPATIENT)
Dept: OTOLARYNGOLOGY | Facility: CLINIC | Age: 58
End: 2018-01-24
Payer: COMMERCIAL

## 2018-01-24 ENCOUNTER — OFFICE VISIT (OUTPATIENT)
Dept: AUDIOLOGY | Facility: CLINIC | Age: 58
End: 2018-01-24
Payer: COMMERCIAL

## 2018-01-24 VITALS — BODY MASS INDEX: 35.37 KG/M2 | TEMPERATURE: 97.8 F | WEIGHT: 207.2 LBS | HEIGHT: 64 IN

## 2018-01-24 DIAGNOSIS — R42 DIZZINESS: Primary | ICD-10-CM

## 2018-01-24 DIAGNOSIS — H69.93 EUSTACHIAN TUBE DISORDER, BILATERAL: Primary | ICD-10-CM

## 2018-01-24 DIAGNOSIS — H81.10 BENIGN PAROXYSMAL POSITIONAL VERTIGO, UNSPECIFIED LATERALITY: ICD-10-CM

## 2018-01-24 PROCEDURE — 92557 COMPREHENSIVE HEARING TEST: CPT | Performed by: AUDIOLOGIST

## 2018-01-24 PROCEDURE — 99203 OFFICE O/P NEW LOW 30 MIN: CPT | Performed by: OTOLARYNGOLOGY

## 2018-01-24 PROCEDURE — 92567 TYMPANOMETRY: CPT | Performed by: AUDIOLOGIST

## 2018-01-24 NOTE — PATIENT INSTRUCTIONS
General Scheduling Information  To schedule your CT/MRI scan, please contact [place] [facility] at [phone]   [address] [place] [address]    To schedule your Surgery, please contact our Specialty Schedulers at [phone]    ENT Clinic Locations Clinic Hours Telephone Number     [facility]  [address] [place] [address]   Tuesday:       8:00am -- 4:00pm    Wednesday:  8:00am - 4:00pm   To schedule an appointment with   Dr. Luis,   please contact our   Specialty Scheduling Department at:     [phone]       [facility]  [address] [place] [address]   Friday:          8:00am - 4:00pm         Urgent Care Locations Clinic Hours Telephone Numbers     [facility]  [address] [place] [address]     Monday-Friday:     11:00pm - 9:00pm    Saturday-Sunday:  9:00am - 5:00pm   [phone]     [facility]  [address] [place] [address]     Monday-Friday:      5:00pm - 9:00pm     Saturday-Sunday:  9:00am - 5:00pm   [phone]

## 2018-01-24 NOTE — LETTER
1/24/2018         RE: Josephine Heard  [address] [place] [address]        Dear Colleague,    Thank you for referring your patient, Josephine Heard, to the [facility]. Please see a copy of my visit note below.    Chief Complaint - Dizziness    History of Present Illness - Josephine Heard is a 57 year old female who presents with dizziness. The patient describes vertigo in which the entire room spins, or they spin, or there is a sense of motion.  It lasts for brief bouts.  This has been going on for a month. The patient had nausea and/or vomiting. This worsened by getting up from a seated or supine position. It is provoked by head movements. It is improved by Epley. She did this at home, and it helped.  Intermittent left otalgia/irritation. No tinnitus. The patient denies a history of migraines. The patient has tried meclizine. Did have vertigo in the 1990s, work-up was negative. She notes long-term left ear issues with wax and ear aches. Feels she touched left ear drum with a q-tip 6 months ago.     Past Medical History -   Patient Active Problem List   Diagnosis     Colloid cyst of third ventricle (H)     Anxiety     Fatty liver     Seasonal allergies     CARDIOVASCULAR SCREENING; LDL GOAL LESS THAN 130     GERD (gastroesophageal reflux disease)     [facility]     RUQ abdominal pain     Vitamin D deficiency     Obesity     HCD (health care directive)     Essential hypertension with goal blood pressure less than 140/90     Non morbid obesity, unspecified obesity type       Current Medications -   Current Outpatient Prescriptions:      omeprazole (PRILOSEC) 20 MG CR capsule, Take 1 capsule (20 mg) by mouth 2 times daily, Disp: 180 capsule, Rfl: 3     lisinopril (PRINIVIL/ZESTRIL) 10 MG tablet, TAKE 1 TABLET(10 MG) BY MOUTH DAILY, Disp: 90 tablet, Rfl: 1     cetirizine (ZYRTEC) 10 MG tablet, Take 10 mg by mouth daily, Disp: , Rfl:      VITAMIN D, CHOLECALCIFEROL, PO, Take  1,000 Units by mouth daily, Disp: , Rfl:      fluticasone (FLONASE) 50 MCG/ACT nasal spray, Spray 1-2 sprays into both nostrils daily, Disp: 3 Package, Rfl: 1     Black Cohosh 40 MG CAPS, Take  by mouth., Disp: , Rfl:      EVENING PRIMROSE OIL PO, Take  by mouth., Disp: , Rfl:      hyoscyamine (ANASPAZ,LEVSIN) 0.125 MG tablet, Take 1-2 tablets by mouth every 4 hours as needed for cramping., Disp: 40 tablet, Rfl: 1     ASPIRIN 81 MG OR TABS, 1 TABLET DAILY, Disp: , Rfl:      IBUPROFEN 200 MG OR TABS, 1-2 TABLETS on as needed basis, Disp: , Rfl:      CALCIUM 600 + D OR, ONCE A DAY, Disp: , Rfl:      FISH OIL, ONCE A DAY, Disp: , Rfl:      MIRALAX OR, AS NEEDED, Disp: , Rfl:      meclizine (ANTIVERT) 12.5 MG tablet, Take 2 tablets (25 mg) by mouth 3 times daily as needed (Patient not taking: Reported on 1/24/2018), Disp: 90 tablet, Rfl: 0     ALPRAZOLAM 0.25 MG PO TABS, 1 TABLET 3 TIMES DAILY as needed, Disp: , Rfl:     Allergies -   Allergies   Allergen Reactions     Contrast Dye        Social History -   Social History     Social History     Marital status:      Spouse name: N/A     Number of children: N/A     Years of education: N/A     Social History Main Topics     Smoking status: Former Smoker     Types: Cigarettes     Quit date: 1/24/2012     Smokeless tobacco: Never Used     Alcohol use Yes      Comment: GLASS OF WINE EVERY NIGHT     Drug use: No     Sexual activity: No     Other Topics Concern     Parent/Sibling W/ Cabg, Mi Or Angioplasty Before 65f 55m? No      Service No     Blood Transfusions No     Caffeine Concern No     Occupational Exposure No     Hobby Hazards No     Sleep Concern No     Stress Concern No     Weight Concern Yes     Special Diet No     Back Care No     Exercise No     Bike Helmet No     Seat Belt No     Self-Exams No     Social History Narrative       Family History -   Family History   Problem Relation Age of Onset     Eye Disorder Mother      GLAUCOMA      "GASTROINTESTINAL DISEASE Mother      TWISTED BOWEL     Gynecology Mother      UTERINE CA /BREAST     Neurologic Disorder Mother      Migraines     Breast Cancer Mother      Glaucoma Mother      DIABETES Father      Eye Disorder Maternal Grandmother      GLAUCOMA     CEREBROVASCULAR DISEASE Maternal Grandmother      Glaucoma Maternal Grandmother      DIABETES Maternal Grandfather      CEREBROVASCULAR DISEASE Maternal Grandfather      Blood Disease Sister      HEPATITIS C     Unknown/Adopted Sister      Unknown/Adopted Sister    mother had migraines.     Review of Systems - As per HPI and PMHx, otherwise 7 system review of the head and neck negative.    Physical Exam  Temp 97.8  F (36.6  C) (Tympanic)  Ht 1.626 m (5' 4\")  Wt 94 kg (207 lb 3.2 oz)  BMI 35.57 kg/m2  General - The patient is in no distress.  Alert and oriented x3, answers questions and cooperates with examination appropriately.   Voice and Breathing - The patient was breathing comfortably without the use of accessory muscles. There was no wheezing, stridor, or stertor.  The patients voice was clear and strong, with no dysphonia.    Eyes - Pupils are reactive to light. Extraocular movements intact. Sclera were not icteric or injected, conjunctiva were pink and moist. No nystagmus.  Neurologic - Cranial nerves II-XII are grossly intact. Specifically, the facial nerve is intact, House-Brackmann grade 1 of 6.   Mouth - Examination of the oral cavity showed pink, healthy oral mucosa. No lesions or ulcerations noted.  The tongue was mobile and protrudes midline.   Oropharynx - The walls of the oropharynx were smooth, pink, moist, symmetric, and had no lesions or ulcerations. The uvula was midline and the palate raised symmetrically. Absent tonsils.  Neck -  Palpation of the occipital, submental, submandibular, internal jugular chain, and supraclavicular nodes did not demonstrate any abnormal lymph nodes or masses. The parotid glands were without masses. " Palpation of the thyroid was soft and smooth, with no nodules or goiter appreciated.  The trachea was midline.  Ears - The auricles appeared normal. The external auditory canals were nonedematous and nonerythematous. The tympanic membranes are normal in appearance, bony landmarks are intact.  No retraction, perforation, or masses.  No fluid or purulence was seen in the external canal or the middle ear.     Audiologic Studies - An audiogram and tympanogram were performed today as part of the evaluation and personally reviewed. The tympanogram shows normal Type A curves, with normal canal volumes and middle ear pressures.  There is no sign of eustachian tube dysfunction or middle ear effusion.  The audiogram was also normal.      A/P - Josephine Heard is a 57 year old female with dizziness. This seems most likely BPPV. Normal hearing. She has treated herself with Epley and is improved. She can do this in the future. Return if hearing loss or tinnitus occurs or dizziness lasts more than minutes.        Albert Luis MD  Otolaryngology  [facility]      Again, thank you for allowing me to participate in the care of your patient.        Sincerely,        Albert Luis MD

## 2018-01-24 NOTE — MR AVS SNAPSHOT
After Visit Summary   1/24/2018    Josephine Heard    MRN: [id number]           Patient Information     Date Of Birth          1960        Visit Information        Provider Department      1/24/2018 9:45 AM [place] [facility]        Today's Diagnoses     Eustachian tube disorder, bilateral    -  1       Follow-ups after your visit        Who to contact     If you have questions or need follow up information about today's clinic visit or your schedule please contact [facility] directly at [phone].  Normal or non-critical lab and imaging results will be communicated to you by [email or web address]hart, letter or phone within 4 business days after the clinic has received the results. If you do not hear from us within 7 days, please contact the clinic through [employer] or phone. If you have a critical or abnormal lab result, we will notify you by phone as soon as possible.  Submit refill requests through [employer] or call your pharmacy and they will forward the refill request to us. Please allow 3 business days for your refill to be completed.          Additional Information About Your Visit        MyChart Information     [employer] gives you secure access to your electronic health record. If you see a primary care provider, you can also send messages to your care team and make appointments. If you have questions, please call your primary care clinic.  If you do not have a primary care provider, please call [phone] and they will assist you.        Care EveryWhere ID     This is your Care EveryWhere ID. This could be used by other organizations to access your [facility] medical records  [id number]         Blood Pressure from Last 3 Encounters:   11/14/17 128/72   06/07/17 132/78   05/02/17 165/79    Weight from Last 3 Encounters:   01/24/18 207 lb 3.2 oz (94 kg)   11/14/17 202 lb (91.6 kg)   06/07/17 203 lb (92.1 kg)              We Performed the Following      AUDIOGRAM/TYMPANOGRAM - INTERFACE     COMPREHENSIVE HEARING TEST     TYMPANOMETRY        Primary Care Provider Office Phone # Fax #    Greer Cody [phone] [address] [facility] [address]        Equal Access to Services     OBDULIA SMITH : Hadii javier ku hadmandyo Soomaali, waaxda luqadaha, qaybta kaalmada adeegyada, waxmarleen idiin hayaaronn adelarissa castro laJuniormian streeter. So [facility] [phone].    ATENCIÓN: Si habla español, tiene a duran disposición servicios gratuitos de asistencia lingüística. Llame al [phone].    We comply with applicable federal civil rights laws and [place] laws. We do not discriminate on the basis of race, color, national origin, age, disability, sex, sexual orientation, or gender identity.            Thank you!     Thank you for choosing [facility]  for your care. Our goal is always to provide you with excellent care. Hearing back from our patients is one way we can continue to improve our services. Please take a few minutes to complete the written survey that you may receive in the mail after your visit with us. Thank you!             Your Updated Medication List - Protect others around you: Learn how to safely use, store and throw away your medicines at www.disposemymeds.org.          This list is accurate as of 1/24/18 10:25 AM.  Always use your most recent med list.                   Brand Name Dispense Instructions for use Diagnosis    ALPRAZolam 0.25 MG tablet    XANAX     1 TABLET 3 TIMES DAILY as needed        aspirin 81 MG tablet      1 TABLET DAILY        Black Cohosh 40 MG Caps      Take  by mouth.        CALCIUM 600 + D PO      ONCE A DAY        cetirizine 10 MG tablet    zyrTEC     Take 10 mg by mouth daily        EVENING PRIMROSE OIL PO      Take  by mouth.        FISH OIL      ONCE A DAY        fluticasone 50 MCG/ACT spray    FLONASE    3 Package    Spray 1-2 sprays into both nostrils daily    Seasonal allergies       hyoscyamine 0.125 MG  tablet    ANASPAZ/LEVSIN    40 tablet    Take 1-2 tablets by mouth every 4 hours as needed for cramping.    IBS (irritable colon syndrome), RUQ abdominal pain, Food intolerance       ibuprofen 200 MG tablet    ADVIL/MOTRIN     1-2 TABLETS on as needed basis        lisinopril 10 MG tablet    PRINIVIL/ZESTRIL    90 tablet    TAKE 1 TABLET(10 MG) BY MOUTH DAILY    Essential hypertension with goal blood pressure less than 140/90       meclizine 12.5 MG tablet    ANTIVERT    90 tablet    Take 2 tablets (25 mg) by mouth 3 times daily as needed    Vertigo       MIRALAX PO      AS NEEDED        omeprazole 20 MG CR capsule    priLOSEC    180 capsule    Take 1 capsule (20 mg) by mouth 2 times daily    Gastroesophageal reflux disease without esophagitis       VITAMIN D (CHOLECALCIFEROL) PO      Take 1,000 Units by mouth daily

## 2018-01-24 NOTE — PROGRESS NOTES
AUDIOLOGY REPORT:    Patient was referred to Audiology from ENT by Dr. Luis for a hearing examination.  She reports longstanding feeling of fullness bilaterally and vertigo.  Patient denies, otalgia, otorrhea, tinnitus and slightly worse hearing sensitivity on the left side.    Testing:    Otoscopy:   Otoscopic exam indicates partial obstruction with cerumen bilaterally     Tympanograms:    RIGHT: restricted eardrum mobility      LEFT:   restricted eardrum mobility       Thresholds:   Pure Tone Thresholds assessed using conventional audiometry with good  reliability from 250-8000 Hz bilaterally using circumaural headphones     RIGHT:  normal hearing sensitivity at all frequencies tested    LEFT:    normal hearing sensitivity at all frequencies tested    Speech Reception Threshold:    RIGHT: 10 dB HL    LEFT:   10 dB HL    Word Recognition Score:     RIGHT: 100% at 50 dB HL using NU-6 recorded word list.    LEFT:   100% at 50 dB HL using NU-6 recorded word list.    Discussed results with the patient. Patient requested and was provided with a copy of her audiogram.    Patient was returned to ENT for follow up.     Ramana Valdes MA, CCC-A  Licensed Audiologist, #[id number]    Nacho Raya, CCC-A  Licensed Audiologist #[id number]  1/24/2018

## 2018-01-24 NOTE — PROGRESS NOTES
Chief Complaint - Dizziness    History of Present Illness - Josephine Heard is a 57 year old female who presents with dizziness. The patient describes vertigo in which the entire room spins, or they spin, or there is a sense of motion.  It lasts for brief bouts.  This has been going on for a month. The patient had nausea and/or vomiting. This worsened by getting up from a seated or supine position. It is provoked by head movements. It is improved by Epley. She did this at home, and it helped.  Intermittent left otalgia/irritation. No tinnitus. The patient denies a history of migraines. The patient has tried meclizine. Did have vertigo in the 1990s, work-up was negative. She notes long-term left ear issues with wax and ear aches. Feels she touched left ear drum with a q-tip 6 months ago.     Past Medical History -   Patient Active Problem List   Diagnosis     Colloid cyst of third ventricle (H)     Anxiety     Fatty liver     Seasonal allergies     CARDIOVASCULAR SCREENING; LDL GOAL LESS THAN 130     GERD (gastroesophageal reflux disease)     [facility]     RUQ abdominal pain     Vitamin D deficiency     Obesity     HCD (health care directive)     Essential hypertension with goal blood pressure less than 140/90     Non morbid obesity, unspecified obesity type       Current Medications -   Current Outpatient Prescriptions:      omeprazole (PRILOSEC) 20 MG CR capsule, Take 1 capsule (20 mg) by mouth 2 times daily, Disp: 180 capsule, Rfl: 3     lisinopril (PRINIVIL/ZESTRIL) 10 MG tablet, TAKE 1 TABLET(10 MG) BY MOUTH DAILY, Disp: 90 tablet, Rfl: 1     cetirizine (ZYRTEC) 10 MG tablet, Take 10 mg by mouth daily, Disp: , Rfl:      VITAMIN D, CHOLECALCIFEROL, PO, Take 1,000 Units by mouth daily, Disp: , Rfl:      fluticasone (FLONASE) 50 MCG/ACT nasal spray, Spray 1-2 sprays into both nostrils daily, Disp: 3 Package, Rfl: 1     Black Cohosh 40 MG CAPS, Take  by mouth., Disp: , Rfl:      EVENING PRIMROSE OIL  PO, Take  by mouth., Disp: , Rfl:      hyoscyamine (ANASPAZ,LEVSIN) 0.125 MG tablet, Take 1-2 tablets by mouth every 4 hours as needed for cramping., Disp: 40 tablet, Rfl: 1     ASPIRIN 81 MG OR TABS, 1 TABLET DAILY, Disp: , Rfl:      IBUPROFEN 200 MG OR TABS, 1-2 TABLETS on as needed basis, Disp: , Rfl:      CALCIUM 600 + D OR, ONCE A DAY, Disp: , Rfl:      FISH OIL, ONCE A DAY, Disp: , Rfl:      MIRALAX OR, AS NEEDED, Disp: , Rfl:      meclizine (ANTIVERT) 12.5 MG tablet, Take 2 tablets (25 mg) by mouth 3 times daily as needed (Patient not taking: Reported on 1/24/2018), Disp: 90 tablet, Rfl: 0     ALPRAZOLAM 0.25 MG PO TABS, 1 TABLET 3 TIMES DAILY as needed, Disp: , Rfl:     Allergies -   Allergies   Allergen Reactions     Contrast Dye        Social History -   Social History     Social History     Marital status:      Spouse name: N/A     Number of children: N/A     Years of education: N/A     Social History Main Topics     Smoking status: Former Smoker     Types: Cigarettes     Quit date: 1/24/2012     Smokeless tobacco: Never Used     Alcohol use Yes      Comment: GLASS OF WINE EVERY NIGHT     Drug use: No     Sexual activity: No     Other Topics Concern     Parent/Sibling W/ Cabg, Mi Or Angioplasty Before 65f 55m? No      Service No     Blood Transfusions No     Caffeine Concern No     Occupational Exposure No     Hobby Hazards No     Sleep Concern No     Stress Concern No     Weight Concern Yes     Special Diet No     Back Care No     Exercise No     Bike Helmet No     Seat Belt No     Self-Exams No     Social History Narrative       Family History -   Family History   Problem Relation Age of Onset     Eye Disorder Mother      GLAUCOMA     GASTROINTESTINAL DISEASE Mother      TWISTED BOWEL     Gynecology Mother      UTERINE CA /BREAST     Neurologic Disorder Mother      Migraines     Breast Cancer Mother      Glaucoma Mother      DIABETES Father      Eye Disorder Maternal Grandmother       "GLAUCOMA     CEREBROVASCULAR DISEASE Maternal Grandmother      Glaucoma Maternal Grandmother      DIABETES Maternal Grandfather      CEREBROVASCULAR DISEASE Maternal Grandfather      Blood Disease Sister      HEPATITIS C     Unknown/Adopted Sister      Unknown/Adopted Sister    mother had migraines.     Review of Systems - As per HPI and PMHx, otherwise 7 system review of the head and neck negative.    Physical Exam  Temp 97.8  F (36.6  C) (Tympanic)  Ht 1.626 m (5' 4\")  Wt 94 kg (207 lb 3.2 oz)  BMI 35.57 kg/m2  General - The patient is in no distress.  Alert and oriented x3, answers questions and cooperates with examination appropriately.   Voice and Breathing - The patient was breathing comfortably without the use of accessory muscles. There was no wheezing, stridor, or stertor.  The patients voice was clear and strong, with no dysphonia.    Eyes - Pupils are reactive to light. Extraocular movements intact. Sclera were not icteric or injected, conjunctiva were pink and moist. No nystagmus.  Neurologic - Cranial nerves II-XII are grossly intact. Specifically, the facial nerve is intact, House-Brackmann grade 1 of 6.   Mouth - Examination of the oral cavity showed pink, healthy oral mucosa. No lesions or ulcerations noted.  The tongue was mobile and protrudes midline.   Oropharynx - The walls of the oropharynx were smooth, pink, moist, symmetric, and had no lesions or ulcerations. The uvula was midline and the palate raised symmetrically. Absent tonsils.  Neck -  Palpation of the occipital, submental, submandibular, internal jugular chain, and supraclavicular nodes did not demonstrate any abnormal lymph nodes or masses. The parotid glands were without masses. Palpation of the thyroid was soft and smooth, with no nodules or goiter appreciated.  The trachea was midline.  Ears - The auricles appeared normal. The external auditory canals were nonedematous and nonerythematous. The tympanic membranes are normal in " appearance, bony landmarks are intact.  No retraction, perforation, or masses.  No fluid or purulence was seen in the external canal or the middle ear.     Audiologic Studies - An audiogram and tympanogram were performed today as part of the evaluation and personally reviewed. The tympanogram shows normal Type A curves, with normal canal volumes and middle ear pressures.  There is no sign of eustachian tube dysfunction or middle ear effusion.  The audiogram was also normal.      A/P - Josephine Heard is a 57 year old female with dizziness. This seems most likely BPPV. Normal hearing. She has treated herself with Epley and is improved. She can do this in the future. Return if hearing loss or tinnitus occurs or dizziness lasts more than minutes.        Albert Luis MD  Otolaryngology  [facility]

## 2018-01-24 NOTE — MR AVS SNAPSHOT
After Visit Summary   1/24/2018    Josephine Heard    MRN: [id number]           Patient Information     Date Of Birth          1960        Visit Information        Provider Department      1/24/2018 10:15 AM Albert Luis MD [facility]        Today's Diagnoses     Dizziness    -  1    Benign paroxysmal positional vertigo, unspecified laterality          Care Instructions    General Scheduling Information  To schedule your CT/MRI scan, please contact [place] [facility] at [phone] [address] [place] [address]    To schedule your Surgery, please contact our Specialty Schedulers at [phone]    ENT Clinic Locations Clinic Hours Telephone Number     [facility]  [address]  [place] [address]   Tuesday:       8:00am -- 4:00pm    Wednesday:  8:00am - 4:00pm   To schedule an appointment with   Dr. Luis,   please contact our   Specialty Scheduling Department at:     [phone]       [facility]  [address] [place] [address]   Friday:          8:00am - 4:00pm         Urgent Care Locations Clinic Hours Telephone Numbers     [facility] [place]  [address]  [place] [address]     Monday-Friday:     11:00pm - 9:00pm    Saturday-Sunday:  9:00am - 5:00pm   [phone]     [facility]  [address] [place] [address]     Monday-Friday:      5:00pm - 9:00pm     Saturday-Sunday:  9:00am - 5:00pm   [phone]                 Follow-ups after your visit        Additional Services     AUDIOLOGY ADULT REFERRAL       Your provider has referred you to: [facility] [facility] [phone]   [email or web address]    Treatment:  Evaluation & Treatment  Specialty Testing:  Audiogram w/Tymps and Reflexes    Please be aware that coverage of these services is subject to the terms and limitations of your health insurance plan.  Call member services at your health plan with any benefit or  "coverage questions.      Please bring the following to your appointment:  >>   Any x-rays, CTs or MRIs which have been performed.  Contact the facility where they were done to arrange for  prior to your scheduled appointment.   >>   List of current medications  >>   This referral request   >>   Any documents/labs given to you for this referral                  Who to contact     If you have questions or need follow up information about today's clinic visit or your schedule please contact [facility] directly at [phone].  Normal or non-critical lab and imaging results will be communicated to you by Diasomehart, letter or phone within 4 business days after the clinic has received the results. If you do not hear from us within 7 days, please contact the clinic through [employer] or phone. If you have a critical or abnormal lab result, we will notify you by phone as soon as possible.  Submit refill requests through [employer] or call your pharmacy and they will forward the refill request to us. Please allow 3 business days for your refill to be completed.          Additional Information About Your Visit        DiasomeharNeotract Information     [employer] gives you secure access to your electronic health record. If you see a primary care provider, you can also send messages to your care team and make appointments. If you have questions, please call your primary care clinic.  If you do not have a primary care provider, please call [phone] and they will assist you.        Care EveryWhere ID     This is your Care EveryWhere ID. This could be used by other organizations to access your [place] medical records  [id number]        Your Vitals Were     Temperature Height BMI (Body Mass Index)             97.8  F (36.6  C) (Tympanic) 1.626 m (5' 4\") 35.57 kg/m2          Blood Pressure from Last 3 Encounters:   11/14/17 128/72   06/07/17 132/78   05/02/17 165/79    Weight from Last 3 Encounters:   01/24/18 94 kg (207 lb " 3.2 oz)   11/14/17 91.6 kg (202 lb)   06/07/17 92.1 kg (203 lb)              We Performed the Following     AUDIOLOGY ADULT REFERRAL        Primary Care Provider Office Phone # Fax #    Greer Coyd [phone] [address] [facility] [address]        Equal Access to Services     [facility]: Hadii aad ku hadasho Soomaali, waaxda luqadaha, qaybta kaalmada adeegyada, waxay idiin hayaan adeeg kharash la'aan ah. So [facility] [phone].    ATENCIÓN: Si habla espsara, tiene a duran disposición servicios gratuitos de asistencia lingüística. Nayla al [phone].    We comply with applicable federal civil rights laws and [place] laws. We do not discriminate on the basis of race, color, national origin, age, disability, sex, sexual orientation, or gender identity.            Thank you!     Thank you for choosing [facility]  for your care. Our goal is always to provide you with excellent care. Hearing back from our patients is one way we can continue to improve our services. Please take a few minutes to complete the written survey that you may receive in the mail after your visit with us. Thank you!             Your Updated Medication List - Protect others around you: Learn how to safely use, store and throw away your medicines at www.disposemymeds.org.          This list is accurate as of 1/24/18 11:07 AM.  Always use your most recent med list.                   Brand Name Dispense Instructions for use Diagnosis    ALPRAZolam 0.25 MG tablet    XANAX     1 TABLET 3 TIMES DAILY as needed        aspirin 81 MG tablet      1 TABLET DAILY        Black Cohosh 40 MG Caps      Take  by mouth.        CALCIUM 600 + D PO      ONCE A DAY        cetirizine 10 MG tablet    zyrTEC     Take 10 mg by mouth daily        EVENING PRIMROSE OIL PO      Take  by mouth.        FISH OIL      ONCE A DAY        fluticasone 50 MCG/ACT spray    FLONASE    3 Package    Spray 1-2 sprays into both nostrils daily     Seasonal allergies       hyoscyamine 0.125 MG tablet    ANASPAZ/LEVSIN    40 tablet    Take 1-2 tablets by mouth every 4 hours as needed for cramping.    IBS (irritable colon syndrome), RUQ abdominal pain, Food intolerance       ibuprofen 200 MG tablet    ADVIL/MOTRIN     1-2 TABLETS on as needed basis        lisinopril 10 MG tablet    PRINIVIL/ZESTRIL    90 tablet    TAKE 1 TABLET(10 MG) BY MOUTH DAILY    Essential hypertension with goal blood pressure less than 140/90       meclizine 12.5 MG tablet    ANTIVERT    90 tablet    Take 2 tablets (25 mg) by mouth 3 times daily as needed    Vertigo       MIRALAX PO      AS NEEDED        omeprazole 20 MG CR capsule    priLOSEC    180 capsule    Take 1 capsule (20 mg) by mouth 2 times daily    Gastroesophageal reflux disease without esophagitis       VITAMIN D (CHOLECALCIFEROL) PO      Take 1,000 Units by mouth daily

## 2018-06-24 DIAGNOSIS — I10 ESSENTIAL HYPERTENSION WITH GOAL BLOOD PRESSURE LESS THAN 140/90: ICD-10-CM

## 2018-06-27 RX ORDER — LISINOPRIL 10 MG/1
TABLET ORAL
Qty: 90 TABLET | Refills: 1 | Status: SHIPPED | OUTPATIENT
Start: 2018-06-27 | End: 2018-12-28

## 2018-10-25 ENCOUNTER — RADIANT APPOINTMENT (OUTPATIENT)
Dept: GENERAL RADIOLOGY | Facility: CLINIC | Age: 58
End: 2018-10-25
Attending: FAMILY MEDICINE
Payer: COMMERCIAL

## 2018-10-25 ENCOUNTER — OFFICE VISIT (OUTPATIENT)
Dept: FAMILY MEDICINE | Facility: CLINIC | Age: 58
End: 2018-10-25
Payer: COMMERCIAL

## 2018-10-25 VITALS
SYSTOLIC BLOOD PRESSURE: 135 MMHG | HEART RATE: 84 BPM | WEIGHT: 209 LBS | BODY MASS INDEX: 35.87 KG/M2 | RESPIRATION RATE: 16 BRPM | TEMPERATURE: 97.8 F | DIASTOLIC BLOOD PRESSURE: 84 MMHG | OXYGEN SATURATION: 97 %

## 2018-10-25 DIAGNOSIS — M79.642 PAIN OF LEFT HAND: Primary | ICD-10-CM

## 2018-10-25 PROBLEM — E66.01 MORBID OBESITY (H): Status: ACTIVE | Noted: 2018-10-25

## 2018-10-25 PROCEDURE — 73130 X-RAY EXAM OF HAND: CPT | Mod: LT

## 2018-10-25 PROCEDURE — 99214 OFFICE O/P EST MOD 30 MIN: CPT | Performed by: FAMILY MEDICINE

## 2018-10-25 ASSESSMENT — PAIN SCALES - GENERAL: PAINLEVEL: MILD PAIN (3)

## 2018-10-25 NOTE — PROGRESS NOTES
SUBJECTIVE:   Josephine Heard is a 58 year old female who presents to clinic today for the following health issues:      Patient presents with:  Pain: pt c/o pain in her left hand x 1 month, denies any injury    Patient has been having pain over the dorsum of the left hand  Comes and goes  Has been woken up by the pain  No injury  Patient will put lotion and would sometimes soothe it  Pain is described as burning sensation   No numbness or tingling  Comes and goes  Been woken up at night couple of times  During the day would notice it when she's typing or sitting down  No neck pain  No numbness or weakness  No loss of control of bowel or bladder, no numbness or weakness down the arm   No fevers or chills chest pain or shortness of breath    Problem list and histories reviewed & adjusted, as indicated.  Additional history: as documented    Problem list, Medication list, Allergies, and Medical/Social/Surgical histories reviewed in EPIC and updated as appropriate.    ROS:  Constitutional, HEENT, cardiovascular, pulmonary, gi and gu systems are negative, except as otherwise noted.    OBJECTIVE:                                                    /84  Pulse 84  Temp 97.8  F (36.6  C) (Oral)  Resp 16  Wt 209 lb (94.8 kg)  SpO2 97%  Breastfeeding? No  BMI 35.87 kg/m2  Body mass index is 35.87 kg/(m^2).  Awake alert not in any acute cardiorespiratory distress  Psych: pleasant   Neurologic: No gross neurologic deficits  Skin: no obvious lesions or rashes  Musculoskeletal:  No cervical spine tenderness  MMT 5/5 bilateral upper extremity sensory intact  Affected extremity neurovascularly intact, no cyanosis or edema, good pulses and capillary refill.   Left hand tender on 5th and 4th metacarpal base area. But no erythema no warmth no swelling      Diagnostic Test Results:  No results found for this or any previous visit (from the past 24 hour(s)).     ASSESSMENT/PLAN:                                                         ICD-10-CM    1. Pain of left hand M79.642 XR Hand Left G/E 3 Views     MR Hand Left w/o Contrast     xrays pending   Given point tenderness and burning discomfort recommend MRI even if xrays negative   In the meantime recommend wrist splint as well. Given here in clinic. May be sprain  If symptoms persist may proceed with EMG testing (?burning pain neurologic pain?) and if no yield recommend orthopedic follow up .  Adverse reactions of medications discussed.  Over the counter medications discussed.   Aware to come back in if with worsening symptoms or if no relief despite treatment plan  Patient voiced understanding and had no further questions.     MD Cris Garcia MD  [facility]

## 2018-10-25 NOTE — MR AVS SNAPSHOT
After Visit Summary   10/25/2018    Josephine Heard    MRN: [id number]           Patient Information     Date Of Birth          1960        Visit Information        Provider Department      10/25/2018 2:40 PM Cris Edwards MD [facility]        Today's Diagnoses     Pain of left hand    -  1      Care Instructions    Please call the Imaging Center to schedule an appointment for your imaging. [phone]            Follow-ups after your visit        Future tests that were ordered for you today     Open Future Orders        Priority Expected Expires Ordered    MR Hand Left w/o Contrast Routine  10/25/2019 10/25/2018            Who to contact     If you have questions or need follow up information about today's clinic visit or your schedule please contact [facility] directly at [phone].  Normal or non-critical lab and imaging results will be communicated to you by MyChart, letter or phone within 4 business days after the clinic has received the results. If you do not hear from us within 7 days, please contact the clinic through MyChart or phone. If you have a critical or abnormal lab result, we will notify you by phone as soon as possible.  Submit refill requests through [employer] or call your pharmacy and they will forward the refill request to us. Please allow 3 business days for your refill to be completed.          Additional Information About Your Visit        MyChart Information     [employer] gives you secure access to your electronic health record. If you see a primary care provider, you can also send messages to your care team and make appointments. If you have questions, please call your primary care clinic.  If you do not have a primary care provider, please call [phone] and they will assist you.        Care EveryWhere ID     This is your Care EveryWhere ID. This could be used by other organizations to access your [facility]  records  [id number]        Your Vitals Were     Pulse Temperature Respirations Pulse Oximetry Breastfeeding? BMI (Body Mass Index)    84 97.8  F (36.6  C) (Oral) 16 97% No 35.87 kg/m2       Blood Pressure from Last 3 Encounters:   10/25/18 135/84   11/14/17 128/72   06/07/17 132/78    Weight from Last 3 Encounters:   10/25/18 209 lb (94.8 kg)   01/24/18 207 lb 3.2 oz (94 kg)   11/14/17 202 lb (91.6 kg)              We Performed the Following     XR Hand Left G/E 3 Views        Primary Care Provider Office Phone # Fax #    Greer [place] [phone] [address] [facility] [address]        Equal Access to Services     [facility]: Hadii javier aparicio hadasho Soomaali, waaxda luqadaha, qaybta kaalmada adeegyanona, li sood . So [facility] [phone].    ATENCIÓN: Si habla español, tiene a duran disposición servicios gratuitos de asistencia lingüística. Nayla al [phone].    We comply with applicable federal civil rights laws and [place] laws. We do not discriminate on the basis of race, color, national origin, age, disability, sex, sexual orientation, or gender identity.            Thank you!     Thank you for choosing [facility]  for your care. Our goal is always to provide you with excellent care. Hearing back from our patients is one way we can continue to improve our services. Please take a few minutes to complete the written survey that you may receive in the mail after your visit with us. Thank you!             Your Updated Medication List - Protect others around you: Learn how to safely use, store and throw away your medicines at www.disposemymeds.org.          This list is accurate as of 10/25/18  3:00 PM.  Always use your most recent med list.                   Brand Name Dispense Instructions for use Diagnosis    ALPRAZolam 0.25 MG tablet    XANAX     1 TABLET 3 TIMES DAILY as needed        aspirin 81 MG tablet      1 TABLET DAILY         Black Cohosh 40 MG Caps      Take  by mouth.        CALCIUM 600 + D PO      ONCE A DAY        cetirizine 10 MG tablet    zyrTEC     Take 10 mg by mouth daily        EVENING PRIMROSE OIL PO      Take  by mouth.        FISH OIL      ONCE A DAY        fluticasone 50 MCG/ACT spray    FLONASE    3 Package    Spray 1-2 sprays into both nostrils daily    Seasonal allergies       hyoscyamine 0.125 MG tablet    ANASPAZ/LEVSIN    40 tablet    Take 1-2 tablets by mouth every 4 hours as needed for cramping.    IBS (irritable colon syndrome), RUQ abdominal pain, Food intolerance       ibuprofen 200 MG tablet    ADVIL/MOTRIN     1-2 TABLETS on as needed basis        lisinopril 10 MG tablet    PRINIVIL/ZESTRIL    90 tablet    TAKE 1 TABLET(10 MG) BY MOUTH DAILY    Essential hypertension with goal blood pressure less than 140/90       meclizine 12.5 MG tablet    ANTIVERT    90 tablet    Take 2 tablets (25 mg) by mouth 3 times daily as needed    Vertigo       MIRALAX PO      AS NEEDED        omeprazole 20 MG CR capsule    priLOSEC    180 capsule    Take 1 capsule (20 mg) by mouth 2 times daily    Gastroesophageal reflux disease without esophagitis       VITAMIN D (CHOLECALCIFEROL) PO      Take 1,000 Units by mouth daily

## 2018-12-17 ENCOUNTER — DOCUMENTATION ONLY (OUTPATIENT)
Dept: FAMILY MEDICINE | Facility: CLINIC | Age: 58
End: 2018-12-17

## 2018-12-17 DIAGNOSIS — I10 ESSENTIAL HYPERTENSION WITH GOAL BLOOD PRESSURE LESS THAN 140/90: ICD-10-CM

## 2018-12-17 DIAGNOSIS — E55.9 VITAMIN D DEFICIENCY: Primary | ICD-10-CM

## 2018-12-17 DIAGNOSIS — Z13.1 SCREENING FOR DIABETES MELLITUS: ICD-10-CM

## 2018-12-17 DIAGNOSIS — Z13.6 CARDIOVASCULAR SCREENING; LDL GOAL LESS THAN 130: ICD-10-CM

## 2018-12-17 NOTE — PROGRESS NOTES
Please review and sign lab orders.  The patient requests an A1c & Vitamin D so I pended these lab orders.  If they are not needed please cancel the order(s).  Thank you.  Lab 12/28/18  Physical 1/3/19  Felicia Thomas,

## 2018-12-17 NOTE — PROGRESS NOTES
.Please place or confirm pending lab orders for upcoming lab appointment on 12/28/18  Thank you,  Shivani

## 2018-12-20 DIAGNOSIS — Z12.31 VISIT FOR SCREENING MAMMOGRAM: ICD-10-CM

## 2018-12-20 PROCEDURE — 77067 SCR MAMMO BI INCL CAD: CPT | Mod: TC

## 2018-12-28 DIAGNOSIS — I10 ESSENTIAL HYPERTENSION WITH GOAL BLOOD PRESSURE LESS THAN 140/90: ICD-10-CM

## 2018-12-28 RX ORDER — LISINOPRIL 10 MG/1
TABLET ORAL
Qty: 90 TABLET | Refills: 0 | Status: SHIPPED | OUTPATIENT
Start: 2018-12-28 | End: 2019-01-13

## 2018-12-28 NOTE — TELEPHONE ENCOUNTER
Patient needs BMP done and has pending appointment with PCP in Jan 2019.    Sandee Covington, BSN RN

## 2019-01-01 DIAGNOSIS — K21.9 GASTROESOPHAGEAL REFLUX DISEASE WITHOUT ESOPHAGITIS: ICD-10-CM

## 2019-01-04 DIAGNOSIS — Z13.6 CARDIOVASCULAR SCREENING; LDL GOAL LESS THAN 130: ICD-10-CM

## 2019-01-04 DIAGNOSIS — I10 ESSENTIAL HYPERTENSION WITH GOAL BLOOD PRESSURE LESS THAN 140/90: ICD-10-CM

## 2019-01-04 DIAGNOSIS — Z13.1 SCREENING FOR DIABETES MELLITUS: ICD-10-CM

## 2019-01-04 DIAGNOSIS — E55.9 VITAMIN D DEFICIENCY: ICD-10-CM

## 2019-01-04 LAB
ANION GAP SERPL CALCULATED.3IONS-SCNC: 7 MMOL/L (ref 3–14)
BUN SERPL-MCNC: 20 MG/DL (ref 7–30)
CALCIUM SERPL-MCNC: 8.8 MG/DL (ref 8.5–10.1)
CHLORIDE SERPL-SCNC: 107 MMOL/L (ref 94–109)
CHOLEST SERPL-MCNC: 165 MG/DL
CO2 SERPL-SCNC: 26 MMOL/L (ref 20–32)
CREAT SERPL-MCNC: 1.01 MG/DL (ref 0.52–1.04)
DEPRECATED CALCIDIOL+CALCIFEROL SERPL-MC: 30 UG/L (ref 20–75)
GFR SERPL CREATININE-BSD FRML MDRD: 61 ML/MIN/{1.73_M2}
GLUCOSE SERPL-MCNC: 94 MG/DL (ref 70–99)
HBA1C MFR BLD: 5.4 % (ref 0–5.6)
HDLC SERPL-MCNC: 53 MG/DL
LDLC SERPL CALC-MCNC: 95 MG/DL
NONHDLC SERPL-MCNC: 112 MG/DL
POTASSIUM SERPL-SCNC: 4.4 MMOL/L (ref 3.4–5.3)
SODIUM SERPL-SCNC: 140 MMOL/L (ref 133–144)
TRIGL SERPL-MCNC: 85 MG/DL

## 2019-01-04 PROCEDURE — 80048 BASIC METABOLIC PNL TOTAL CA: CPT | Performed by: FAMILY MEDICINE

## 2019-01-04 PROCEDURE — 82306 VITAMIN D 25 HYDROXY: CPT | Performed by: FAMILY MEDICINE

## 2019-01-04 PROCEDURE — 80061 LIPID PANEL: CPT | Performed by: FAMILY MEDICINE

## 2019-01-04 PROCEDURE — 36415 COLL VENOUS BLD VENIPUNCTURE: CPT | Performed by: FAMILY MEDICINE

## 2019-01-04 PROCEDURE — 83036 HEMOGLOBIN GLYCOSYLATED A1C: CPT | Performed by: FAMILY MEDICINE

## 2019-01-16 ENCOUNTER — OFFICE VISIT (OUTPATIENT)
Dept: FAMILY MEDICINE | Facility: CLINIC | Age: 59
End: 2019-01-16
Payer: COMMERCIAL

## 2019-01-16 VITALS
SYSTOLIC BLOOD PRESSURE: 134 MMHG | OXYGEN SATURATION: 97 % | RESPIRATION RATE: 18 BRPM | DIASTOLIC BLOOD PRESSURE: 80 MMHG | BODY MASS INDEX: 35.34 KG/M2 | HEART RATE: 72 BPM | HEIGHT: 64 IN | WEIGHT: 207 LBS | TEMPERATURE: 97.5 F

## 2019-01-16 DIAGNOSIS — K21.9 GASTROESOPHAGEAL REFLUX DISEASE WITHOUT ESOPHAGITIS: ICD-10-CM

## 2019-01-16 DIAGNOSIS — I10 ESSENTIAL HYPERTENSION WITH GOAL BLOOD PRESSURE LESS THAN 140/90: ICD-10-CM

## 2019-01-16 DIAGNOSIS — Z00.00 ROUTINE HISTORY AND PHYSICAL EXAMINATION OF ADULT: Primary | ICD-10-CM

## 2019-01-16 DIAGNOSIS — E66.01 MORBID OBESITY (H): ICD-10-CM

## 2019-01-16 PROCEDURE — 99396 PREV VISIT EST AGE 40-64: CPT | Performed by: FAMILY MEDICINE

## 2019-01-16 RX ORDER — LISINOPRIL 10 MG/1
10 TABLET ORAL DAILY
Qty: 90 TABLET | Refills: 3 | Status: SHIPPED | OUTPATIENT
Start: 2019-01-16 | End: 2020-03-23

## 2019-01-16 ASSESSMENT — MIFFLIN-ST. JEOR: SCORE: 1503.95

## 2019-01-16 NOTE — PROGRESS NOTES
SUBJECTIVE:   CC: Josephine Heard is an 58 year old woman who presents for preventive health visit.     Healthy Habits:    Do you get at least three servings of calcium containing foods daily (dairy, green leafy vegetables, etc.)? yes    Amount of exercise or daily activities, outside of work: 6 day(s) per week    Problems taking medications regularly No    Medication side effects: No    Have you had an eye exam in the past two years? yes    Do you see a dentist twice per year? yes    Do you have sleep apnea, excessive snoring or daytime drowsiness?no          Today's PHQ-2 Score:   PHQ-2 (  [employer]) 2017   Q1: Little interest or pleasure in doing things 0 0   Q2: Feeling down, depressed or hopeless 0 0   PHQ-2 Score 0 0   Q1: Little interest or pleasure in doing things - Not at all   Q2: Feeling down, depressed or hopeless - Not at all   PHQ-2 Score Incomplete 0       Abuse: Current or Past(Physical, Sexual or Emotional)- No  Do you feel safe in your environment? Yes    Social History     Tobacco Use     Smoking status: Former Smoker     Types: Cigarettes     Last attempt to quit: 2012     Years since quittin.9     Smokeless tobacco: Never Used   Substance Use Topics     Alcohol use: Yes     Comment: GLASS OF WINE EVERY NIGHT     If you drink alcohol do you typically have >3 drinks per day or >7 drinks per week? Less than 7 per week                      Reviewed orders with patient.  Reviewed health maintenance and updated orders accordingly - Yes  Labs reviewed in [facility]    Mammogram Screening: Patient over age 50, mutual decision to screen reflected in health maintenance.    Pertinent mammograms are reviewed under the imaging tab.  History of abnormal Pap smear: Status post hysterectomy. Pap still indicated. no     Reviewed and updated as needed this visit by clinical staff  Tobacco  Allergies  Meds  Med Hx  Surg Hx  Fam Hx  Soc Hx        Reviewed and updated as needed this visit  "by Provider  Meds            ROS:  CONSTITUTIONAL: NEGATIVE for fever, chills, change in weight  INTEGUMENTARY/SKIN: NEGATIVE for worrisome rashes, moles or lesions  EYES: NEGATIVE for vision changes or irritation  ENT: NEGATIVE for ear, mouth and throat problems  RESP: NEGATIVE for significant cough or SOB  BREAST: NEGATIVE for masses, tenderness or discharge  CV: NEGATIVE for chest pain, palpitations or peripheral edema  GI: NEGATIVE for nausea, abdominal pain, heartburn, or change in bowel habits  : NEGATIVE for unusual urinary or vaginal symptoms. No vaginal bleeding.  MUSCULOSKELETAL: NEGATIVE for significant arthralgias or myalgia  NEURO: NEGATIVE for weakness, dizziness or paresthesias  PSYCHIATRIC: NEGATIVE for changes in mood or affect     OBJECTIVE:   /80   Pulse 72   Temp 97.5  F (36.4  C) (Oral)   Resp 18   Ht 1.626 m (5' 4\")   Wt 93.9 kg (207 lb)   SpO2 97%   BMI 35.53 kg/m    EXAM:  GENERAL: healthy, alert and no distress  EYES: Eyes grossly normal to inspection, PERRL and conjunctivae and sclerae normal  HENT: ear canals and TM's normal, nose and mouth without ulcers or lesions  NECK: no adenopathy, no asymmetry, masses, or scars and thyroid normal to palpation  RESP: lungs clear to auscultation - no rales, rhonchi or wheezes  BREAST: normal without masses, tenderness or nipple discharge and no palpable axillary masses or adenopathy  CV: regular rate and rhythm, normal S1 S2, no S3 or S4, no murmur, click or rub, no peripheral edema and peripheral pulses strong  ABDOMEN: soft, nontender, no hepatosplenomegaly, no masses and bowel sounds normal  MS: no gross musculoskeletal defects noted, no edema  SKIN: no suspicious lesions or rashes  NEURO: Normal strength and tone, mentation intact and speech normal  PSYCH: mentation appears normal, affect normal/bright    Diagnostic Test Results:  none     ASSESSMENT/PLAN:   1. Routine history and physical examination of adult      2. Essential " "hypertension with goal blood pressure less than 140/90  At goal on meds, follow-up in one yaer  - lisinopril (PRINIVIL/ZESTRIL) 10 MG tablet; Take 1 tablet (10 mg) by mouth daily  Dispense: 90 tablet; Refill: 3    3. Gastroesophageal reflux disease without esophagitis  Refill as needed  - omeprazole (PRILOSEC) 20 MG DR capsule; TAKE 1 CAPSULE(20 MG) BY MOUTH TWICE DAILY  Dispense: 180 capsule; Refill: 3    4. Morbid obesity (H)  Encouraged regular exercise and healthy diet      COUNSELING:   Reviewed preventive health counseling, as reflected in patient instructions       Regular exercise       Healthy diet/nutrition       Vision screening    BP Readings from Last 1 Encounters:   01/16/19 134/80     Estimated body mass index is 35.53 kg/m  as calculated from the following:    Height as of this encounter: 1.626 m (5' 4\").    Weight as of this encounter: 93.9 kg (207 lb).      Weight management plan: Discussed healthy diet and exercise guidelines     reports that she quit smoking about 6 years ago. Her smoking use included cigarettes. she has never used smokeless tobacco.      Counseling Resources:  ATP IV Guidelines  Pooled Cohorts Equation Calculator  Breast Cancer Risk Calculator  FRAX Risk Assessment  ICSI Preventive Guidelines  Dietary Guidelines for Americans, 2010  USDA's MyPlate  ASA Prophylaxis  Lung CA Screening    Greer Herrmann MD  [facility]  "

## 2019-03-27 DIAGNOSIS — I10 ESSENTIAL HYPERTENSION WITH GOAL BLOOD PRESSURE LESS THAN 140/90: ICD-10-CM

## 2019-03-27 RX ORDER — LISINOPRIL 10 MG/1
TABLET ORAL
Qty: 90 TABLET | Refills: 0 | OUTPATIENT
Start: 2019-03-27

## 2019-07-29 ENCOUNTER — TRANSFERRED RECORDS (OUTPATIENT)
Dept: HEALTH INFORMATION MANAGEMENT | Facility: CLINIC | Age: 59
End: 2019-07-29

## 2019-09-28 ENCOUNTER — HEALTH MAINTENANCE LETTER (OUTPATIENT)
Age: 59
End: 2019-09-28

## 2019-10-29 ENCOUNTER — OFFICE VISIT (OUTPATIENT)
Dept: FAMILY MEDICINE | Facility: CLINIC | Age: 59
End: 2019-10-29
Payer: COMMERCIAL

## 2019-10-29 VITALS
SYSTOLIC BLOOD PRESSURE: 138 MMHG | OXYGEN SATURATION: 100 % | BODY MASS INDEX: 34.84 KG/M2 | TEMPERATURE: 97.8 F | DIASTOLIC BLOOD PRESSURE: 84 MMHG | HEART RATE: 77 BPM | WEIGHT: 203 LBS

## 2019-10-29 DIAGNOSIS — J06.9 VIRAL URI: Primary | ICD-10-CM

## 2019-10-29 PROCEDURE — 99213 OFFICE O/P EST LOW 20 MIN: CPT | Performed by: PHYSICIAN ASSISTANT

## 2019-10-29 ASSESSMENT — PAIN SCALES - GENERAL: PAINLEVEL: NO PAIN (0)

## 2019-10-29 NOTE — NURSING NOTE
"Chief Complaint   Patient presents with     URI       Initial BP (!) 150/89   Pulse 77   Temp 97.8  F (36.6  C) (Oral)   Wt 92.1 kg (203 lb)   SpO2 100%   BMI 34.84 kg/m   Estimated body mass index is 34.84 kg/m  as calculated from the following:    Height as of 1/16/19: 1.626 m (5' 4\").    Weight as of this encounter: 92.1 kg (203 lb).  Medication Reconciliation: complete    OWEN Abbasi MA    "

## 2019-10-29 NOTE — PATIENT INSTRUCTIONS

## 2019-10-29 NOTE — PROGRESS NOTES
Subjective     Josephine Heard is a 59 year old female who presents to clinic today for the following health issues:    She was ill with viral URI symptoms 10/12 and they lasted for a week. She was feeling better and now is sick again. Her  was also ill and home for a few days with similar symptoms. She is clammy, she has watery eyes and congestion. She feels tired. She is using over the counter medications.     HPI   ENT Symptoms             Symptoms: cc Present Absent Comment   Fever/Chills   x    Fatigue  x     Muscle Aches  x     Eye Irritation  x     Sneezing  x     Nasal Emanuel/Drg  x     Sinus Pressure/Pain  x     Loss of smell   x    Dental pain   x    Sore Throat  x  Possible due to coughing   Swollen Glands   x    Ear Pain/Fullness   x    Cough  x     Wheeze   x    Chest Pain   x    Shortness of breath   x    Rash   x    Other         Symptom duration:  started 10/12/19   Symptom severity:  mild   Treatments tried:  ibuprofen   Contacts:  n/a         Patient Active Problem List   Diagnosis     Colloid cyst of third ventricle (H)     Anxiety     Fatty liver     Seasonal allergies     CARDIOVASCULAR SCREENING; LDL GOAL LESS THAN 130     GERD (gastroesophageal reflux disease)     [facility]     RUQ abdominal pain     Vitamin D deficiency     Obesity     HCD (health care directive)     Essential hypertension with goal blood pressure less than 140/90     Non morbid obesity, unspecified obesity type     Obesity (BMI 35.0-39.9) with comorbidity (H)     Past Surgical History:   Procedure Laterality Date     APPENDECTOMY  7/29/2019     BIOPSY       COLONOSCOPY  2/19/2013    Procedure: COLONOSCOPY;  Colonoscopy, screening;  Surgeon: Gaetano Saravia MD;  Location:  OR      LAPAROSCOPIC MYOMECTOMY, 1 - 4 INTRAMURAL MYOMAS =<250 GM  89     HC REMOVE TONSILS/ADENOIDS,<13 Y/O       HYSTERECTOMY, PAP NO LONGER INDICATED       HYSTERECTOMY, NADER  07    secondary to menorrhagia     TUBAL/ECTOPIC  PREGNANCY         Social History     Tobacco Use     Smoking status: Former Smoker     Packs/day: 0.00     Years: 0.00     Pack years: 0.00     Types: Cigarettes     Last attempt to quit: 2012     Years since quittin.7     Smokeless tobacco: Never Used   Substance Use Topics     Alcohol use: Yes     Comment: GLASS OF WINE EVERY NIGHT     Family History   Problem Relation Age of Onset     Eye Disorder Mother         GLAUCOMA     Gastrointestinal Disease Mother         TWISTED BOWEL     Gynecology Mother         UTERINE CA /BREAST     Neurologic Disorder Mother         Migraines     Breast Cancer Mother      Glaucoma Mother      Diabetes Father      Eye Disorder Maternal Grandmother         GLAUCOMA     Cerebrovascular Disease Maternal Grandmother      Glaucoma Maternal Grandmother      Diabetes Maternal Grandfather      Cerebrovascular Disease Maternal Grandfather      Blood Disease Sister         HEPATITIS C     Unknown/Adopted Sister      Unknown/Adopted Sister          Current Outpatient Medications   Medication Sig Dispense Refill     ALPRAZOLAM 0.25 MG PO TABS 1 TABLET 3 TIMES DAILY as needed       ASPIRIN 81 MG OR TABS 1 TABLET DAILY       Black Cohosh 40 MG CAPS Take  by mouth.       CALCIUM 600 + D OR ONCE A DAY       cetirizine (ZYRTEC) 10 MG tablet Take 10 mg by mouth daily       EVENING PRIMROSE OIL PO Take  by mouth.       FISH OIL ONCE A DAY       fluticasone (FLONASE) 50 MCG/ACT nasal spray Spray 1-2 sprays into both nostrils daily 3 Package 1     hyoscyamine (ANASPAZ,LEVSIN) 0.125 MG tablet Take 1-2 tablets by mouth every 4 hours as needed for cramping. 40 tablet 1     IBUPROFEN 200 MG OR TABS 1-2 TABLETS on as needed basis       lisinopril (PRINIVIL/ZESTRIL) 10 MG tablet Take 1 tablet (10 mg) by mouth daily 90 tablet 3     meclizine (ANTIVERT) 12.5 MG tablet Take 2 tablets (25 mg) by mouth 3 times daily as needed 90 tablet 0     MIRALAX OR AS NEEDED       omeprazole (PRILOSEC) 20 MG  DR capsule TAKE 1 CAPSULE(20 MG) BY MOUTH TWICE DAILY 180 capsule 3     VITAMIN D, CHOLECALCIFEROL, PO Take 1,000 Units by mouth daily       Allergies   Allergen Reactions     Contrast Dye      BP Readings from Last 3 Encounters:   10/29/19 138/84   01/16/19 134/80   10/25/18 135/84    Wt Readings from Last 3 Encounters:   10/29/19 92.1 kg (203 lb)   01/16/19 93.9 kg (207 lb)   10/25/18 94.8 kg (209 lb)                    Reviewed and updated as needed this visit by Provider  Tobacco  Allergies  Meds  Problems  Med Hx  Surg Hx  Fam Hx         Review of Systems   ROS COMP: Constitutional, HEENT, cardiovascular, pulmonary, GI, , musculoskeletal, neuro, skin, endocrine and psych systems are negative, except as otherwise noted.      Objective    /84   Pulse 77   Temp 97.8  F (36.6  C) (Oral)   Wt 92.1 kg (203 lb)   SpO2 100%   BMI 34.84 kg/m    Body mass index is 34.84 kg/m .  Physical Exam   GENERAL: healthy, alert and no distress  EYES: Eyes grossly normal to inspection, PERRL and conjunctivae and sclerae normal  HENT: ear canals and TM's normal, nose and mouth without ulcers or lesions  NECK: no adenopathy, no asymmetry, masses, or scars and thyroid normal to palpation  RESP: lungs clear to auscultation - no rales, rhonchi or wheezes  CV: regular rate and rhythm, normal S1 S2, no S3 or S4, no murmur, click or rub, no peripheral edema and peripheral pulses strong  ABDOMEN: soft, nontender, no hepatosplenomegaly, no masses and bowel sounds normal  MS: no gross musculoskeletal defects noted, no edema  SKIN: no suspicious lesions or rashes  PSYCH: mentation appears normal, affect normal/bright    Diagnostic Test Results:  Labs reviewed in Epic        Assessment & Plan     1. Viral URI  Continue with symptomatic treatments with OTC medications also, rest and fluids.   Discussed appropriate use of antibiotics.   Handout also given           Return in about 2 weeks (around 11/12/2019) for with primary  provider, as needed if symptoms worsen or persist.    Kristen M. Kehr, PA-C  [facility]

## 2020-03-04 DIAGNOSIS — K21.9 GASTROESOPHAGEAL REFLUX DISEASE WITHOUT ESOPHAGITIS: ICD-10-CM

## 2020-03-04 NOTE — TELEPHONE ENCOUNTER
"Next 5 appointments (look out 90 days)    Mar 12, 2020  2:45 PM CDT  [facility] [address] [facility] [address] [phone]   Mar 25, 2020  1:40 PM CDT  [facility] [address] [facility] [address] [phone]        Requested Prescriptions   Signed Prescriptions Disp Refills    omeprazole (PRILOSEC) 20 MG DR capsule 180 capsule 0     Sig: TAKE 1 CAPSULE(20 MG) BY MOUTH TWICE DAILY       PPI Protocol Passed - 3/4/2020  3:12 AM        Passed - Not on Clopidogrel (unless Pantoprazole ordered)        Passed - No diagnosis of osteoporosis on record        Passed - Recent (12 mo) or future (30 days) visit within the authorizing provider's specialty     Patient has had an office visit with the authorizing provider or a provider within the authorizing providers department within the previous 12 mos or has a future within next 30 days. See \"Patient Info\" tab in inbasket, or \"Choose Columns\" in Meds & Orders section of the refill encounter.              Passed - Medication is active on med list        Passed - Patient is age 18 or older        Passed - No active pregnacy on record        Passed - No positive pregnancy test in past 12 months          "

## 2020-03-11 ENCOUNTER — DOCUMENTATION ONLY (OUTPATIENT)
Dept: FAMILY MEDICINE | Facility: CLINIC | Age: 60
End: 2020-03-11

## 2020-03-11 DIAGNOSIS — I10 ESSENTIAL HYPERTENSION WITH GOAL BLOOD PRESSURE LESS THAN 140/90: ICD-10-CM

## 2020-03-11 DIAGNOSIS — Z13.6 CARDIOVASCULAR SCREENING; LDL GOAL LESS THAN 130: Primary | ICD-10-CM

## 2020-03-12 DIAGNOSIS — Z12.31 VISIT FOR SCREENING MAMMOGRAM: ICD-10-CM

## 2020-03-12 PROCEDURE — 77063 BREAST TOMOSYNTHESIS BI: CPT | Mod: TC

## 2020-03-12 PROCEDURE — 77067 SCR MAMMO BI INCL CAD: CPT | Mod: TC

## 2020-03-15 ENCOUNTER — HEALTH MAINTENANCE LETTER (OUTPATIENT)
Age: 60
End: 2020-03-15

## 2020-03-23 ENCOUNTER — MYC MEDICAL ADVICE (OUTPATIENT)
Dept: FAMILY MEDICINE | Facility: CLINIC | Age: 60
End: 2020-03-23

## 2020-03-23 DIAGNOSIS — I10 ESSENTIAL HYPERTENSION WITH GOAL BLOOD PRESSURE LESS THAN 140/90: ICD-10-CM

## 2020-03-23 RX ORDER — LISINOPRIL 10 MG/1
10 TABLET ORAL DAILY
Qty: 90 TABLET | Refills: 0 | Status: SHIPPED | OUTPATIENT
Start: 2020-03-23 | End: 2020-07-01

## 2020-06-18 DIAGNOSIS — I10 ESSENTIAL HYPERTENSION WITH GOAL BLOOD PRESSURE LESS THAN 140/90: ICD-10-CM

## 2020-06-19 RX ORDER — LISINOPRIL 10 MG/1
TABLET ORAL
Qty: 90 TABLET | Refills: 0 | OUTPATIENT
Start: 2020-06-19

## 2020-06-30 NOTE — PROGRESS NOTES
"Josephine Heard is a 59 year old female who is being evaluated via a billable video visit.      The patient has been notified of following:     \"This video visit will be conducted via a call between you and your physician/provider. We have found that certain health care needs can be provided without the need for an in-person physical exam.  This service lets us provide the care you need with a video conversation.  If a prescription is necessary we can send it directly to your pharmacy.  If lab work is needed we can place an order for that and you can then stop by our lab to have the test done at a later time.    Video visits are billed at different rates depending on your insurance coverage.  Please reach out to your insurance provider with any questions.    If during the course of the call the physician/provider feels a video visit is not appropriate, you will not be charged for this service.\"    Patient has given verbal consent for Video visit? Yes  How would you like to obtain your AVS? declined  Patient would like the video invitation sent by:[phone]  Will anyone else be joining your video visit? No      Subjective     Josephine Heard is a 59 year old female who presents today via video visit for the following health issues:    HPI  Hypertension Follow-up      Do you check your blood pressure regularly outside of the clinic? Yes     Are you following a low salt diet? Yes    Are your blood pressures ever more than 140 on the top number (systolic) OR more   than 90 on the bottom number (diastolic), for example 140/90? No                Video Start Time: 8:02 AM    Pt with HTN, well controlled on medication  Due for labwork  Home BP reading last night was 128/60    Pt with GERD on prilosec  Working well   Only taking once per day so has plenty left    She has no other concerns    Reviewed and updated as needed this visit by Provider         Review of Systems   Constitutional, HEENT, cardiovascular, pulmonary, gi and " gu systems are negative, except as otherwise noted.      Objective             Physical Exam     GENERAL: Healthy, alert and no distress  EYES: Eyes grossly normal to inspection.  No discharge or erythema, or obvious scleral/conjunctival abnormalities.  RESP: No audible wheeze, cough, or visible cyanosis.  No visible retractions or increased work of breathing.    SKIN: Visible skin clear. No significant rash, abnormal pigmentation or lesions.  NEURO: Cranial nerves grossly intact.  Mentation and speech appropriate for age.  PSYCH: Mentation appears normal, affect normal/bright, judgement and insight intact, normal speech and appearance well-groomed.      Diagnostic Test Results:  Labs reviewed in Epic        Assessment & Plan     1. Essential hypertension with goal blood pressure less than 140/90  At goal per home BP readings, follow-up in 6-12 months  - lisinopril (ZESTRIL) 10 MG tablet; Take 1 tablet (10 mg) by mouth daily  Dispense: 90 tablet; Refill: 3    2. Gastroesophageal reflux disease without esophagitis  On prilosec once per day in yeimy and working well    3. Morbid obesity (H)  Encourage regular activity and healthy diet             No follow-ups on file.    Greer Sanchez MD  [facility]      Video-Visit Details    Type of service:  Video Visit    Video End Time:8:09    Originating Location (pt. Location): Home    Distant Location (provider location):  [facility]     Platform used for Video Visit: Doximity    No follow-ups on file.       Greer Sanchez MD

## 2020-07-01 ENCOUNTER — VIRTUAL VISIT (OUTPATIENT)
Dept: FAMILY MEDICINE | Facility: CLINIC | Age: 60
End: 2020-07-01
Payer: COMMERCIAL

## 2020-07-01 DIAGNOSIS — K21.9 GASTROESOPHAGEAL REFLUX DISEASE WITHOUT ESOPHAGITIS: ICD-10-CM

## 2020-07-01 DIAGNOSIS — E66.01 MORBID OBESITY (H): ICD-10-CM

## 2020-07-01 DIAGNOSIS — I10 ESSENTIAL HYPERTENSION WITH GOAL BLOOD PRESSURE LESS THAN 140/90: ICD-10-CM

## 2020-07-01 PROCEDURE — 99213 OFFICE O/P EST LOW 20 MIN: CPT | Mod: 95 | Performed by: FAMILY MEDICINE

## 2020-07-01 RX ORDER — LISINOPRIL 10 MG/1
10 TABLET ORAL DAILY
Qty: 90 TABLET | Refills: 3 | Status: SHIPPED | OUTPATIENT
Start: 2020-07-01 | End: 2021-07-07

## 2020-07-09 DIAGNOSIS — I10 ESSENTIAL HYPERTENSION WITH GOAL BLOOD PRESSURE LESS THAN 140/90: ICD-10-CM

## 2020-07-09 DIAGNOSIS — Z13.6 CARDIOVASCULAR SCREENING; LDL GOAL LESS THAN 130: ICD-10-CM

## 2020-07-09 LAB
ANION GAP SERPL CALCULATED.3IONS-SCNC: 5 MMOL/L (ref 3–14)
BUN SERPL-MCNC: 21 MG/DL (ref 7–30)
CALCIUM SERPL-MCNC: 8.6 MG/DL (ref 8.5–10.1)
CHLORIDE SERPL-SCNC: 108 MMOL/L (ref 94–109)
CHOLEST SERPL-MCNC: 198 MG/DL
CO2 SERPL-SCNC: 27 MMOL/L (ref 20–32)
CREAT SERPL-MCNC: 0.94 MG/DL (ref 0.52–1.04)
GFR SERPL CREATININE-BSD FRML MDRD: 66 ML/MIN/{1.73_M2}
GLUCOSE SERPL-MCNC: 102 MG/DL (ref 70–99)
HDLC SERPL-MCNC: 59 MG/DL
LDLC SERPL CALC-MCNC: 114 MG/DL
NONHDLC SERPL-MCNC: 139 MG/DL
POTASSIUM SERPL-SCNC: 4.4 MMOL/L (ref 3.4–5.3)
SODIUM SERPL-SCNC: 140 MMOL/L (ref 133–144)
TRIGL SERPL-MCNC: 125 MG/DL

## 2020-07-09 PROCEDURE — 80061 LIPID PANEL: CPT | Performed by: FAMILY MEDICINE

## 2020-07-09 PROCEDURE — 36415 COLL VENOUS BLD VENIPUNCTURE: CPT | Performed by: FAMILY MEDICINE

## 2020-07-09 PROCEDURE — 80048 BASIC METABOLIC PNL TOTAL CA: CPT | Performed by: FAMILY MEDICINE

## 2020-09-30 ENCOUNTER — MYC REFILL (OUTPATIENT)
Dept: FAMILY MEDICINE | Facility: CLINIC | Age: 60
End: 2020-09-30

## 2020-09-30 DIAGNOSIS — I10 ESSENTIAL HYPERTENSION WITH GOAL BLOOD PRESSURE LESS THAN 140/90: ICD-10-CM

## 2020-09-30 RX ORDER — LISINOPRIL 10 MG/1
10 TABLET ORAL DAILY
Qty: 90 TABLET | Refills: 3 | Status: CANCELLED | OUTPATIENT
Start: 2020-09-30

## 2020-10-09 ENCOUNTER — ALLIED HEALTH/NURSE VISIT (OUTPATIENT)
Dept: NURSING | Facility: CLINIC | Age: 60
End: 2020-10-09
Payer: COMMERCIAL

## 2020-10-09 DIAGNOSIS — Z23 NEED FOR PROPHYLACTIC VACCINATION AND INOCULATION AGAINST INFLUENZA: Primary | ICD-10-CM

## 2020-10-09 PROCEDURE — 90471 IMMUNIZATION ADMIN: CPT

## 2020-10-09 PROCEDURE — 90682 RIV4 VACC RECOMBINANT DNA IM: CPT

## 2021-03-25 ENCOUNTER — IMMUNIZATION (OUTPATIENT)
Dept: NURSING | Facility: CLINIC | Age: 61
End: 2021-03-25
Payer: COMMERCIAL

## 2021-03-25 PROCEDURE — 91300 PR COVID VAC PFIZER DIL RECON 30 MCG/0.3 ML IM: CPT

## 2021-03-25 PROCEDURE — 0001A PR COVID VAC PFIZER DIL RECON 30 MCG/0.3 ML IM: CPT

## 2021-03-29 DIAGNOSIS — K21.9 GASTROESOPHAGEAL REFLUX DISEASE WITHOUT ESOPHAGITIS: ICD-10-CM

## 2021-04-15 ENCOUNTER — IMMUNIZATION (OUTPATIENT)
Dept: NURSING | Facility: CLINIC | Age: 61
End: 2021-04-15
Attending: INTERNAL MEDICINE
Payer: COMMERCIAL

## 2021-04-15 PROCEDURE — 0002A PR COVID VAC PFIZER DIL RECON 30 MCG/0.3 ML IM: CPT

## 2021-04-15 PROCEDURE — 91300 PR COVID VAC PFIZER DIL RECON 30 MCG/0.3 ML IM: CPT

## 2021-05-06 NOTE — LETTER
June 19, 2020    Josephine Heard  [address] [facility] [address]        Dear Josephine,       We recently received a refill request for LISINOPRIL 10MG TABLETS.  We have not refilled this because you are due for a:    Hypertension video visit      Please call at your earliest convenience so that there will not be a delay with your future refills.          Thank you,   Your [facility] Team/[facility]  [phone]             Valtrex Pregnancy And Lactation Text: this medication is Pregnancy Category B and is considered safe during pregnancy. This medication is not directly found in breast milk but it's metabolite acyclovir is present.

## 2021-05-08 ENCOUNTER — HEALTH MAINTENANCE LETTER (OUTPATIENT)
Age: 61
End: 2021-05-08

## 2021-07-06 DIAGNOSIS — I10 ESSENTIAL HYPERTENSION WITH GOAL BLOOD PRESSURE LESS THAN 140/90: ICD-10-CM

## 2021-07-07 RX ORDER — LISINOPRIL 10 MG/1
10 TABLET ORAL DAILY
Qty: 90 TABLET | Refills: 0 | Status: SHIPPED | OUTPATIENT
Start: 2021-07-07 | End: 2021-08-11

## 2021-07-07 NOTE — TELEPHONE ENCOUNTER
"Requested Prescriptions   Pending Prescriptions Disp Refills     lisinopril (ZESTRIL) 10 MG tablet [Pharmacy Med Name: LISINOPRIL 10MG TABLETS] 90 tablet 3     Sig: TAKE 1 TABLET(10 MG) BY MOUTH DAILY       ACE Inhibitors (Including Combos) Protocol Failed - 7/7/2021  9:31 AM        Failed - Blood pressure under 140/90 in past 12 months     BP Readings from Last 3 Encounters:   10/29/19 138/84   01/16/19 134/80   10/25/18 135/84                 Failed - Recent (12 mo) or future (30 days) visit within the authorizing provider's specialty     Patient has had an office visit with the authorizing provider or a provider within the authorizing providers department within the previous 12 mos or has a future within next 30 days. See \"Patient Info\" tab in inbasket, or \"Choose Columns\" in Meds & Orders section of the refill encounter.              Passed - Medication is active on med list        Passed - Patient is age 18 or older        Passed - No active pregnancy on record        Passed - Normal serum creatinine on file in past 12 months     Recent Labs   Lab Test 07/09/20  0707   CR 0.94       Ok to refill medication if creatinine is low          Passed - Normal serum potassium on file in past 12 months     Recent Labs   Lab Test 07/09/20  0707   POTASSIUM 4.4             Passed - No positive pregnancy test within past 12 months           Sandee OLIVON, RN    "

## 2021-07-29 DIAGNOSIS — K21.9 GASTROESOPHAGEAL REFLUX DISEASE WITHOUT ESOPHAGITIS: ICD-10-CM

## 2021-07-29 NOTE — TELEPHONE ENCOUNTER
Routing refill request to provider for review/approval because:  Marnie given x1 and patient did not follow up, please advise

## 2021-08-11 ENCOUNTER — OFFICE VISIT (OUTPATIENT)
Dept: FAMILY MEDICINE | Facility: CLINIC | Age: 61
End: 2021-08-11
Payer: COMMERCIAL

## 2021-08-11 VITALS
DIASTOLIC BLOOD PRESSURE: 84 MMHG | RESPIRATION RATE: 18 BRPM | SYSTOLIC BLOOD PRESSURE: 148 MMHG | BODY MASS INDEX: 34.31 KG/M2 | HEIGHT: 64 IN | HEART RATE: 80 BPM | TEMPERATURE: 98.7 F | WEIGHT: 201 LBS | OXYGEN SATURATION: 98 %

## 2021-08-11 DIAGNOSIS — K21.9 GASTROESOPHAGEAL REFLUX DISEASE WITHOUT ESOPHAGITIS: ICD-10-CM

## 2021-08-11 DIAGNOSIS — Z00.00 ROUTINE GENERAL MEDICAL EXAMINATION AT A HEALTH CARE FACILITY: Primary | ICD-10-CM

## 2021-08-11 DIAGNOSIS — R59.1 LYMPHADENOPATHY: ICD-10-CM

## 2021-08-11 DIAGNOSIS — Q04.6 COLLOID CYST OF THIRD VENTRICLE (H): ICD-10-CM

## 2021-08-11 DIAGNOSIS — M54.2 NECK PAIN ON LEFT SIDE: ICD-10-CM

## 2021-08-11 DIAGNOSIS — Z12.31 ENCOUNTER FOR SCREENING MAMMOGRAM FOR BREAST CANCER: ICD-10-CM

## 2021-08-11 DIAGNOSIS — I10 ESSENTIAL HYPERTENSION WITH GOAL BLOOD PRESSURE LESS THAN 140/90: ICD-10-CM

## 2021-08-11 PROBLEM — E66.01 MORBID OBESITY (H): Status: RESOLVED | Noted: 2018-10-25 | Resolved: 2021-08-11

## 2021-08-11 LAB
ANION GAP SERPL CALCULATED.3IONS-SCNC: 6 MMOL/L (ref 3–14)
BUN SERPL-MCNC: 20 MG/DL (ref 7–30)
CALCIUM SERPL-MCNC: 9.6 MG/DL (ref 8.5–10.1)
CHLORIDE BLD-SCNC: 106 MMOL/L (ref 94–109)
CHOLEST SERPL-MCNC: 204 MG/DL
CO2 SERPL-SCNC: 25 MMOL/L (ref 20–32)
CREAT SERPL-MCNC: 1.01 MG/DL (ref 0.52–1.04)
FASTING STATUS PATIENT QL REPORTED: ABNORMAL
GFR SERPL CREATININE-BSD FRML MDRD: 61 ML/MIN/1.73M2
GLUCOSE BLD-MCNC: 92 MG/DL (ref 70–99)
HDLC SERPL-MCNC: 65 MG/DL
LDLC SERPL CALC-MCNC: 121 MG/DL
NONHDLC SERPL-MCNC: 139 MG/DL
POTASSIUM BLD-SCNC: 3.9 MMOL/L (ref 3.4–5.3)
SODIUM SERPL-SCNC: 137 MMOL/L (ref 133–144)
TRIGL SERPL-MCNC: 92 MG/DL

## 2021-08-11 PROCEDURE — 36415 COLL VENOUS BLD VENIPUNCTURE: CPT | Performed by: FAMILY MEDICINE

## 2021-08-11 PROCEDURE — 99396 PREV VISIT EST AGE 40-64: CPT | Performed by: FAMILY MEDICINE

## 2021-08-11 PROCEDURE — 80061 LIPID PANEL: CPT | Performed by: FAMILY MEDICINE

## 2021-08-11 PROCEDURE — 80048 BASIC METABOLIC PNL TOTAL CA: CPT | Performed by: FAMILY MEDICINE

## 2021-08-11 PROCEDURE — 99214 OFFICE O/P EST MOD 30 MIN: CPT | Mod: 25 | Performed by: FAMILY MEDICINE

## 2021-08-11 RX ORDER — LISINOPRIL 10 MG/1
10 TABLET ORAL DAILY
Qty: 90 TABLET | Refills: 3 | Status: SHIPPED | OUTPATIENT
Start: 2021-08-11 | End: 2022-10-06

## 2021-08-11 ASSESSMENT — ENCOUNTER SYMPTOMS
HEMATOCHEZIA: 0
NERVOUS/ANXIOUS: 0
PARESTHESIAS: 0
CHILLS: 0
FREQUENCY: 0
PALPITATIONS: 0
DYSURIA: 0
DIZZINESS: 0
HEADACHES: 0
FEVER: 0
CONSTIPATION: 0
HEMATURIA: 0
ARTHRALGIAS: 0
HEARTBURN: 0
DIARRHEA: 0
WEAKNESS: 0
MYALGIAS: 1
ABDOMINAL PAIN: 0
BREAST MASS: 0
JOINT SWELLING: 0
SORE THROAT: 0
COUGH: 0
EYE PAIN: 0
NAUSEA: 0
SHORTNESS OF BREATH: 0

## 2021-08-11 ASSESSMENT — MIFFLIN-ST. JEOR: SCORE: 1466.73

## 2021-08-11 NOTE — PROGRESS NOTES
SUBJECTIVE:   CC: Josephine Heard is an 60 year old woman who presents for preventive health visit.       Patient has been advised of split billing requirements and indicates understanding: Yes  Healthy Habits:     Getting at least 3 servings of Calcium per day:  Yes    Bi-annual eye exam:  Yes    Dental care twice a year:  Yes    Sleep apnea or symptoms of sleep apnea:  None    Diet:  Carbohydrate counting    Frequency of exercise:  6-7 days/week    Duration of exercise:  30-45 minutes    Taking medications regularly:  Yes    Medication side effects:  None    PHQ-2 Total Score: 0    Additional concerns today:  No    Pt with HTN not at goal on meds, is on lisinopril 10 mg  Is not checking home BP readings recently    Pt with reflux on ppi. Needs refill. It is working well    Reviewed chart. Pt with colloid cyst of brain about 10 years ago.  Reviewed neurosurg note which recommended repeat MRI in one year. Cannot find that this was done and pt does nt orecall it being done. Will order MRI    Pt with c/o pain in left side of neck, ? Painful lumps in lateral left neck, in front of and below left ear and over left trapezius muscle          Today's PHQ-2 Score:   PHQ-2 ( 1999 Pfizer) 8/11/2021   Q1: Little interest or pleasure in doing things 0   Q2: Feeling down, depressed or hopeless 0   PHQ-2 Score 0   Q1: Little interest or pleasure in doing things Not at all   Q2: Feeling down, depressed or hopeless Not at all   PHQ-2 Score 0       Abuse: Current or Past (Physical, Sexual or Emotional) - No  Do you feel safe in your environment? YES    Have you ever done Advance Care Planning? (For example, a Health Directive, POLST, or a discussion with a medical provider or your loved ones about your wishes): Yes, patient states has an Advance Care Planning document and will bring a copy to the clinic.    Social History     Tobacco Use     Smoking status: Former Smoker     Packs/day: 0.00     Years: 0.00     Pack years: 0.00      Types: Cigarettes     Quit date: 2012     Years since quittin.5     Smokeless tobacco: Never Used   Substance Use Topics     Alcohol use: Yes     Comment: GLASS OF WINE EVERY NIGHT         Alcohol Use 2021   Prescreen: >3 drinks/day or >7 drinks/week? No   Prescreen: >3 drinks/day or >7 drinks/week? -       Reviewed orders with patient.  Reviewed health maintenance and updated orders accordingly - Yes  Lab work is in process    Breast Cancer Screening:  Any new diagnosis of family breast, ovarian, or bowel cancer? No    FHS-7:   Breast CA Risk Assessment (FHS-7) 2021   Did any of your first-degree relatives have breast or ovarian cancer? Yes   Did any of your relatives have bilateral breast cancer? No   Did any man in your family have breast cancer? No   Did any woman in your family have breast and ovarian cancer? Yes   Did any woman in your family have breast cancer before age 50 y? No   Do you have 2 or more relatives with breast and/or ovarian cancer? No   Do you have 2 or more relatives with breast and/or bowel cancer? No       Mammogram Screening: Recommended mammography every 1-2 years with patient discussion and risk factor consideration  Pertinent mammograms are reviewed under the imaging tab.    History of abnormal Pap smear: Status post hysterectomy. Pap still indicated. no     Reviewed and updated as needed this visit by clinical staff  Tobacco  Allergies    Med Hx  Surg Hx  Fam Hx  Soc Hx        Reviewed and updated as needed this visit by Provider                    Review of Systems   Constitutional: Negative for chills and fever.   HENT: Positive for ear pain. Negative for congestion, hearing loss and sore throat.    Eyes: Negative for pain and visual disturbance.   Respiratory: Negative for cough and shortness of breath.    Cardiovascular: Negative for chest pain, palpitations and peripheral edema.   Gastrointestinal: Negative for abdominal pain, constipation, diarrhea,  "heartburn, hematochezia and nausea.   Breasts:  Negative for tenderness, breast mass and discharge.   Genitourinary: Negative for dysuria, frequency, genital sores, hematuria, pelvic pain, urgency, vaginal bleeding and vaginal discharge.   Musculoskeletal: Positive for myalgias. Negative for arthralgias and joint swelling.   Skin: Negative for rash.   Neurological: Negative for dizziness, weakness, headaches and paresthesias.   Psychiatric/Behavioral: Negative for mood changes. The patient is not nervous/anxious.      CONSTITUTIONAL: NEGATIVE for fever, chills, change in weight  INTEGUMENTARY/SKIN: NEGATIVE for worrisome rashes, moles or lesions  EYES: NEGATIVE for vision changes or irritation  ENT: NEGATIVE for ear, mouth and throat problems  RESP: NEGATIVE for significant cough or SOB  BREAST: NEGATIVE for masses, tenderness or discharge  CV: NEGATIVE for chest pain, palpitations or peripheral edema  GI: NEGATIVE for nausea, abdominal pain, heartburn, or change in bowel habits  : NEGATIVE for unusual urinary or vaginal symptoms. No vaginal bleeding.  MUSCULOSKELETAL: NEGATIVE for significant arthralgias or myalgia  NEURO: NEGATIVE for weakness, dizziness or paresthesias  PSYCHIATRIC: NEGATIVE for changes in mood or affect      OBJECTIVE:   BP (!) 148/84   Pulse 80   Temp 98.7  F (37.1  C) (Oral)   Resp 18   Ht 1.626 m (5' 4\")   Wt 91.2 kg (201 lb)   SpO2 98%   BMI 34.50 kg/m    Physical Exam  GENERAL: healthy, alert and no distress  EYES: Eyes grossly normal to inspection, PERRL and conjunctivae and sclerae normal  HENT: ear canals and TM's normal, nose and mouth without ulcers or lesions  NECK:? Enlarged cervi\regla lymphnode at left anterior neck closer to clavicle, does have pain to palpation over left trapezius  RESP: lungs clear to auscultation - no rales, rhonchi or wheezes  BREAST: normal without masses, tenderness or nipple discharge and no palpable axillary masses or adenopathy  CV: regular rate " and rhythm, normal S1 S2, no S3 or S4, no murmur, click or rub, no peripheral edema and peripheral pulses strong  ABDOMEN: soft, nontender, no hepatosplenomegaly, no masses and bowel sounds normal  MS: no gross musculoskeletal defects noted, no edema  SKIN: no suspicious lesions or rashes  NEURO: Normal strength and tone, mentation intact and speech normal  PSYCH: mentation appears normal, affect normal/bright    Diagnostic Test Results:  Labs reviewed in Epic    ASSESSMENT/PLAN:   1. Routine general medical examination at a health care facility      2. Essential hypertension with goal blood pressure less than 140/90  Not at goal, pt to start monitoring. If remains elevated should increase lisinopril to 20 mg daily  - lisinopril (ZESTRIL) 10 MG tablet; Take 1 tablet (10 mg) by mouth daily  Dispense: 90 tablet; Refill: 3  - **Basic metabolic panel FUTURE 14d; Future  - Lipid panel reflex to direct LDL Non-fasting; Future  - Lipid panel reflex to direct LDL Non-fasting  - **Basic metabolic panel FUTURE 14d    3. Gastroesophageal reflux disease without esophagitis  On ppi and working  - omeprazole (PRILOSEC) 20 MG DR capsule; TAKE 1 CAPSULE(20 MG) BY MOUTH TWICE DAILY,  Dispense: 180 capsule; Refill: 3    4. Colloid cyst of third ventricle (H)  Overdue for mri  - MR Brain w/o & w Contrast; Future    5. Lymphadenopathy  ? Tender left lymphn ode  - US Head Neck Soft Tissue; Future    6. Neck pain on left side  Some ptp over trapezius but also anterior left neck and ? TMJ, start with US/ consider PT if US negative    7. Encounter for screening mammogram for breast cancer  Pt scheduled  - *MA Screening Digital Bilateral; Future    Patient has been advised of split billing requirements and indicates understanding: Yes  COUNSELING:  Reviewed preventive health counseling, as reflected in patient instructions       Regular exercise       Healthy diet/nutrition       Vision screening    Estimated body mass index is 34.5 kg/m   "as calculated from the following:    Height as of this encounter: 1.626 m (5' 4\").    Weight as of this encounter: 91.2 kg (201 lb).    Weight management plan: Discussed healthy diet and exercise guidelines    She reports that she quit smoking about 9 years ago. Her smoking use included cigarettes. She smoked 0.00 packs per day for 0.00 years. She has never used smokeless tobacco.      Counseling Resources:  ATP IV Guidelines  Pooled Cohorts Equation Calculator  Breast Cancer Risk Calculator  BRCA-Related Cancer Risk Assessment: FHS-7 Tool  FRAX Risk Assessment  ICSI Preventive Guidelines  Dietary Guidelines for Americans, 2010  USDA's MyPlate  ASA Prophylaxis  Lung CA Screening    Greer Sanchez MD  [facility]  "

## 2021-08-14 ENCOUNTER — MYC MEDICAL ADVICE (OUTPATIENT)
Dept: FAMILY MEDICINE | Facility: CLINIC | Age: 61
End: 2021-08-14

## 2021-08-14 DIAGNOSIS — F40.240 CLAUSTROPHOBIA: Primary | ICD-10-CM

## 2021-08-16 RX ORDER — DIAZEPAM 2 MG
TABLET ORAL
Qty: 5 TABLET | Refills: 0 | Status: SHIPPED | OUTPATIENT
Start: 2021-08-16

## 2021-08-19 ENCOUNTER — HOSPITAL ENCOUNTER (OUTPATIENT)
Dept: MRI IMAGING | Facility: CLINIC | Age: 61
End: 2021-08-19
Attending: FAMILY MEDICINE
Payer: COMMERCIAL

## 2021-08-19 ENCOUNTER — HOSPITAL ENCOUNTER (OUTPATIENT)
Dept: ULTRASOUND IMAGING | Facility: CLINIC | Age: 61
End: 2021-08-19
Attending: FAMILY MEDICINE
Payer: COMMERCIAL

## 2021-08-19 DIAGNOSIS — R59.1 LYMPHADENOPATHY: ICD-10-CM

## 2021-08-19 DIAGNOSIS — Q04.6 COLLOID CYST OF THIRD VENTRICLE (H): ICD-10-CM

## 2021-08-19 PROCEDURE — 76536 US EXAM OF HEAD AND NECK: CPT

## 2021-08-19 PROCEDURE — 70551 MRI BRAIN STEM W/O DYE: CPT

## 2021-08-20 ENCOUNTER — TELEPHONE (OUTPATIENT)
Dept: FAMILY MEDICINE | Facility: CLINIC | Age: 61
End: 2021-08-20

## 2021-08-20 DIAGNOSIS — E23.6 PITUITARY MASS (H): Primary | ICD-10-CM

## 2021-08-20 NOTE — TELEPHONE ENCOUNTER
----- Message from Greer Sanchez MD sent at 8/20/2021 10:31 AM CDT -----  Please call pt.  MRi with ?/ pituitary mass.  Need further imaging with MRI with contrast.   Her allergy list says she is allergic to contrast dye. Can we find out what her reaction is.     Greer Sanchez MD

## 2021-08-20 NOTE — TELEPHONE ENCOUNTER
"RN returned call to pt and relayed provider message below as written.     Pt states her contrast dye reaction occurred shortly after receiving contrast during previous imaging study and reaction was: a significant \"heavy chest feeling,\" like something sitting on her chest, upset stomach, and diarrhea.    Please advise.     PALLAVI EdwardsN, RN    "

## 2021-08-24 RX ORDER — METHYLPREDNISOLONE 16 MG/1
16 TABLET ORAL DAILY
Qty: 1 TABLET | Refills: 0 | Status: SHIPPED | OUTPATIENT
Start: 2021-08-24 | End: 2022-11-08

## 2021-08-24 RX ORDER — DIPHENHYDRAMINE HCL 25 MG
25 CAPSULE ORAL EVERY 6 HOURS PRN
Qty: 5 CAPSULE | Refills: 1 | Status: SHIPPED | OUTPATIENT
Start: 2021-08-24 | End: 2022-11-08

## 2021-08-24 NOTE — TELEPHONE ENCOUNTER
Mri of brain with and without contrast has been ordered  Because of contrast allergy , pt to premedicate with steroids and benadryl  rx sent to pharmacy    Greer Sanchez MD

## 2021-08-24 NOTE — TELEPHONE ENCOUNTER
Left a message to return a call to [phone].  Katherine Hernandez R.N.    RN:  Whomever speaks with Josephine please relay the message from Dr. Herrmann verbatim and document appropriately.  If further support is needed please take down what is needed and route the message back to Dr. Herrmann.  Thank you. Katherine Hernandez R.N.    [id number] [address]

## 2021-08-24 NOTE — TELEPHONE ENCOUNTER
Patient notified of provider's message as written below. She was notified where the Prescription(s) was sent. Patient verbalized good understanding, had no further questions and needed no further support.Katherine Hernandez R.N.

## 2021-08-29 ENCOUNTER — MYC MEDICAL ADVICE (OUTPATIENT)
Dept: FAMILY MEDICINE | Facility: CLINIC | Age: 61
End: 2021-08-29

## 2021-09-01 ENCOUNTER — HOSPITAL ENCOUNTER (OUTPATIENT)
Dept: MRI IMAGING | Facility: CLINIC | Age: 61
Discharge: HOME | End: 2021-09-01
Attending: FAMILY MEDICINE | Admitting: FAMILY MEDICINE
Payer: COMMERCIAL

## 2021-09-01 ENCOUNTER — MYC MEDICAL ADVICE (OUTPATIENT)
Dept: FAMILY MEDICINE | Facility: CLINIC | Age: 61
End: 2021-09-01

## 2021-09-01 DIAGNOSIS — E23.6 PITUITARY MASS (H): ICD-10-CM

## 2021-09-01 PROCEDURE — A9585 GADOBUTROL INJECTION: HCPCS | Performed by: FAMILY MEDICINE

## 2021-09-01 PROCEDURE — 70553 MRI BRAIN STEM W/O & W/DYE: CPT

## 2021-09-01 PROCEDURE — 255N000002 HC RX 255 OP 636: Performed by: FAMILY MEDICINE

## 2021-09-01 RX ORDER — GADOBUTROL 604.72 MG/ML
9 INJECTION INTRAVENOUS ONCE
Status: COMPLETED | OUTPATIENT
Start: 2021-09-01 | End: 2021-09-01

## 2021-09-01 RX ADMIN — GADOBUTROL 9 ML: 604.72 INJECTION INTRAVENOUS at 13:20

## 2021-09-03 ENCOUNTER — MYC MEDICAL ADVICE (OUTPATIENT)
Dept: FAMILY MEDICINE | Facility: CLINIC | Age: 61
End: 2021-09-03

## 2021-09-03 DIAGNOSIS — D35.2 PITUITARY ADENOMA (H): Primary | ICD-10-CM

## 2021-09-13 ENCOUNTER — IMMUNIZATION (OUTPATIENT)
Dept: NURSING | Facility: CLINIC | Age: 61
End: 2021-09-13
Payer: COMMERCIAL

## 2021-09-13 PROCEDURE — 0003A PR COVID VAC PFIZER DIL RECON 30 MCG/0.3 ML IM: CPT

## 2021-09-13 PROCEDURE — 91300 PR COVID VAC PFIZER DIL RECON 30 MCG/0.3 ML IM: CPT

## 2021-09-22 ENCOUNTER — TRANSFERRED RECORDS (OUTPATIENT)
Dept: HEALTH INFORMATION MANAGEMENT | Facility: CLINIC | Age: 61
End: 2021-09-22

## 2021-10-04 ENCOUNTER — ANCILLARY PROCEDURE (OUTPATIENT)
Dept: MAMMOGRAPHY | Facility: CLINIC | Age: 61
End: 2021-10-04
Attending: FAMILY MEDICINE
Payer: COMMERCIAL

## 2021-10-04 DIAGNOSIS — Z12.31 ENCOUNTER FOR SCREENING MAMMOGRAM FOR BREAST CANCER: ICD-10-CM

## 2021-10-04 PROCEDURE — 77063 BREAST TOMOSYNTHESIS BI: CPT | Mod: TC | Performed by: RADIOLOGY

## 2021-10-04 PROCEDURE — 77067 SCR MAMMO BI INCL CAD: CPT | Mod: TC | Performed by: RADIOLOGY

## 2021-10-23 ENCOUNTER — HEALTH MAINTENANCE LETTER (OUTPATIENT)
Age: 61
End: 2021-10-23

## 2022-02-12 ENCOUNTER — HEALTH MAINTENANCE LETTER (OUTPATIENT)
Age: 62
End: 2022-02-12

## 2022-07-21 ENCOUNTER — IMMUNIZATION (OUTPATIENT)
Dept: NURSING | Facility: CLINIC | Age: 62
End: 2022-07-21
Payer: COMMERCIAL

## 2022-07-21 DIAGNOSIS — Z23 HIGH PRIORITY FOR 2019-NCOV VACCINE: Primary | ICD-10-CM

## 2022-07-21 PROCEDURE — 91305 COVID-19,PF,PFIZER (12+ YRS): CPT

## 2022-07-21 PROCEDURE — 99207 PR NO CHARGE LOS: CPT

## 2022-07-21 PROCEDURE — 0054A COVID-19,PF,PFIZER (12+ YRS): CPT

## 2022-08-23 ENCOUNTER — NURSE TRIAGE (OUTPATIENT)
Dept: NURSING | Facility: CLINIC | Age: 62
End: 2022-08-23

## 2022-08-23 NOTE — TELEPHONE ENCOUNTER
Pt calling with concerns about;    Pelvic area is sore x 2 days  Intermittent pain  (4/10 discomfort at worst)    Pt states that she did an aggressive work out on or about 1 week ago  And is wondering if she pulled a muscle.      Pt Denies;  Vomiting  Fever   Severe pelvic pain that interferes with normal activity    According to the protocol, patient should be able to manage these symptoms at home.  Care advice given/when to call back. Patient verbalizes understanding and agrees with plan of care.    Ny Nguyen RN, Nurse Advisor 10:21 PM 8/23/2022  COVID 19 Nurse Triage Plan/Patient Instructions    Please be aware that novel coronavirus (COVID-19) may be circulating in the community. If you develop symptoms such as fever, cough, or SOB or if you have concerns about the presence of another infection including coronavirus (COVID-19), please contact your health care provider or visit [email or web address].     Disposition/Instructions    Home care recommended. Follow home care protocol based instructions.    Thank you for taking steps to prevent the spread of this virus.  o Limit your contact with others.  o Wear a simple mask to cover your cough.  o Wash your hands well and often.      Reason for Disposition    [1] MILD-MODERATE pelvic pain AND [2] constant and [3] present < 2 hours    Additional Information    Negative: Menstrual cramps is main concern    Negative: Abdominal (stomach) pain is main concern    Negative: Vulvar (external genital area) pain or symptoms (e.g., dryness, sores, redness, blisters, bumps) is main concern    Negative: Rectal pain is main concern    Negative: Hip pain is main concern    Negative: Urination pain is main concern (pain with passing urine)    Negative: [1] Pelvic pain AND [2] pregnant < 20 weeks    Negative: [1] Pelvic pain AND [2] pregnant 20 or more weeks    Negative: Postpartum (from 0 to 6 weeks after delivery)    Negative: Pain associated with known hernia or  new-onset hernia suspected (reducible bulge in groin/abdomen)    Negative: Followed an abdomen (stomach) injury    Negative: Followed a genital area injury (e.g., vagina, vulva)    Negative: [1] SEVERE pelvic pain (e.g., excruciating) AND [2] vomiting    Negative: [1] SEVERE pelvic pain AND [2] present > 1 hour    Negative: SEVERE vaginal bleeding (e.g., soaking 2 pads or tampons per hour and present 2 or more hours)    Negative: Patient sounds very sick or weak to the triager    Negative: Shock suspected (e.g., cold/pale/clammy skin, too weak to stand, low BP, rapid pulse)    Negative: Passed out (i.e., lost consciousness, collapsed and was not responding)    Negative: Sounds like a life-threatening emergency to the triager    Negative: [1] MILD-MODERATE pain AND [2] constant AND [3] present > 2 hours    Negative: Fever > 103 F (39.4 C)    Negative: [1] Fever > 101 F (38.3 C) AND [2] age > 60 years    Negative: [1] Fever > 100.0 F (37.8 C) AND [2] bedridden (e.g., nursing home patient, CVA,chronic illness, recovering from surgery)    Negative: [1] Fever > 100.0 F (37.8 C) AND [2] diabetes mellitus or weak immune system (e.g., HIV positive, cancer chemo, splenectomy, organ transplant, chronic steroids)    Negative: [1] SEVERE pelvic pain AND [2] present < 1 hour    Negative: [1] MODERATE (e.g., interferes with normal activities) pelvic pain AND [2] pain comes and goes (cramps) AND [3] present > 24 hours    Negative: [1] MILD (e.g., does not interfere with normal activities) pelvic pain AND [2] pain comes and goes (cramps) AND [3] present > 48 hours    Negative: Pregnancy suspected (e.g., missed last menstrual period)    Negative: Unusual vaginal discharge (e.g., bad smelling, yellow, green, or foamy-white)    Negative: Blood in urine (red, pink, or tea-colored)    Negative: [1] Pain with sexual intercourse (dyspareunia) AND [2] new-onset (in past 4 weeks)    Negative: Patient is worried they have a sexually  transmitted infection (STI)    Negative: [1] MILD-MODERATE pelvic pain AND [2] occurs in the middle of menstrual cycle (2 weeks after first day of period) AND [3] recurring (occurs frequently)    Negative: [1] Pelvic pain is a chronic symptom (recurrent or ongoing AND [2] present > 4 weeks)    Negative: [1] Pain with sexual intercourse (dyspareunia) is a chronic symptom (recurrent or ongoing AND [2] present > 4 weeks)    Negative: [1] MILD-MODERATE pelvic pain AND [2] occurs in the middle of menstrual cycle (2 weeks after first day of period)    Protocols used: PELVIC PAIN - FEMALE-A-AH

## 2022-10-03 ENCOUNTER — MYC MEDICAL ADVICE (OUTPATIENT)
Dept: FAMILY MEDICINE | Facility: CLINIC | Age: 62
End: 2022-10-03

## 2022-10-03 DIAGNOSIS — I10 ESSENTIAL HYPERTENSION WITH GOAL BLOOD PRESSURE LESS THAN 140/90: Primary | ICD-10-CM

## 2022-10-06 DIAGNOSIS — I10 ESSENTIAL HYPERTENSION WITH GOAL BLOOD PRESSURE LESS THAN 140/90: ICD-10-CM

## 2022-10-06 RX ORDER — LISINOPRIL 10 MG/1
TABLET ORAL
Qty: 90 TABLET | Refills: 0 | Status: SHIPPED | OUTPATIENT
Start: 2022-10-06 | End: 2022-11-06

## 2022-10-10 ENCOUNTER — HEALTH MAINTENANCE LETTER (OUTPATIENT)
Age: 62
End: 2022-10-10

## 2022-11-02 ENCOUNTER — HOSPITAL ENCOUNTER (OUTPATIENT)
Dept: MAMMOGRAPHY | Facility: CLINIC | Age: 62
Discharge: HOME | End: 2022-11-02
Attending: FAMILY MEDICINE | Admitting: FAMILY MEDICINE
Payer: COMMERCIAL

## 2022-11-02 DIAGNOSIS — Z12.31 VISIT FOR SCREENING MAMMOGRAM: ICD-10-CM

## 2022-11-02 PROCEDURE — 77067 SCR MAMMO BI INCL CAD: CPT

## 2022-11-04 ENCOUNTER — LAB (OUTPATIENT)
Dept: LAB | Facility: CLINIC | Age: 62
End: 2022-11-04
Payer: COMMERCIAL

## 2022-11-04 DIAGNOSIS — I10 ESSENTIAL HYPERTENSION WITH GOAL BLOOD PRESSURE LESS THAN 140/90: ICD-10-CM

## 2022-11-04 DIAGNOSIS — R73.09 ELEVATED GLUCOSE: ICD-10-CM

## 2022-11-04 LAB
ALBUMIN SERPL-MCNC: 4 G/DL (ref 3.4–5)
ALP SERPL-CCNC: 58 U/L (ref 40–150)
ALT SERPL W P-5'-P-CCNC: 37 U/L (ref 0–50)
ANION GAP SERPL CALCULATED.3IONS-SCNC: 4 MMOL/L (ref 3–14)
AST SERPL W P-5'-P-CCNC: 19 U/L (ref 0–45)
BILIRUB SERPL-MCNC: 0.9 MG/DL (ref 0.2–1.3)
BUN SERPL-MCNC: 18 MG/DL (ref 7–30)
CALCIUM SERPL-MCNC: 9 MG/DL (ref 8.5–10.1)
CHLORIDE BLD-SCNC: 105 MMOL/L (ref 94–109)
CHOLEST SERPL-MCNC: 211 MG/DL
CO2 SERPL-SCNC: 29 MMOL/L (ref 20–32)
CREAT SERPL-MCNC: 0.89 MG/DL (ref 0.52–1.04)
ERYTHROCYTE [DISTWIDTH] IN BLOOD BY AUTOMATED COUNT: 12.8 % (ref 10–15)
FASTING STATUS PATIENT QL REPORTED: YES
GFR SERPL CREATININE-BSD FRML MDRD: 73 ML/MIN/1.73M2
GLUCOSE BLD-MCNC: 112 MG/DL (ref 70–99)
HCT VFR BLD AUTO: 40 % (ref 35–47)
HDLC SERPL-MCNC: 64 MG/DL
HGB BLD-MCNC: 13.8 G/DL (ref 11.7–15.7)
LDLC SERPL CALC-MCNC: 128 MG/DL
MCH RBC QN AUTO: 32.3 PG (ref 26.5–33)
MCHC RBC AUTO-ENTMCNC: 34.5 G/DL (ref 31.5–36.5)
MCV RBC AUTO: 94 FL (ref 78–100)
NONHDLC SERPL-MCNC: 147 MG/DL
PLATELET # BLD AUTO: 273 10E3/UL (ref 150–450)
POTASSIUM BLD-SCNC: 4.2 MMOL/L (ref 3.4–5.3)
PROT SERPL-MCNC: 7.6 G/DL (ref 6.8–8.8)
RBC # BLD AUTO: 4.27 10E6/UL (ref 3.8–5.2)
SODIUM SERPL-SCNC: 138 MMOL/L (ref 133–144)
TRIGL SERPL-MCNC: 94 MG/DL
WBC # BLD AUTO: 5.8 10E3/UL (ref 4–11)

## 2022-11-04 PROCEDURE — 80053 COMPREHEN METABOLIC PANEL: CPT

## 2022-11-04 PROCEDURE — 85027 COMPLETE CBC AUTOMATED: CPT

## 2022-11-04 PROCEDURE — 36415 COLL VENOUS BLD VENIPUNCTURE: CPT

## 2022-11-04 PROCEDURE — 83036 HEMOGLOBIN GLYCOSYLATED A1C: CPT

## 2022-11-04 PROCEDURE — 80061 LIPID PANEL: CPT

## 2022-11-08 ENCOUNTER — OFFICE VISIT (OUTPATIENT)
Dept: FAMILY MEDICINE | Facility: CLINIC | Age: 62
End: 2022-11-08
Payer: COMMERCIAL

## 2022-11-08 VITALS
HEART RATE: 73 BPM | BODY MASS INDEX: 33.97 KG/M2 | SYSTOLIC BLOOD PRESSURE: 139 MMHG | WEIGHT: 199 LBS | HEIGHT: 64 IN | OXYGEN SATURATION: 99 % | RESPIRATION RATE: 16 BRPM | TEMPERATURE: 98.2 F | DIASTOLIC BLOOD PRESSURE: 89 MMHG

## 2022-11-08 DIAGNOSIS — M54.12 CERVICAL RADICULOPATHY: ICD-10-CM

## 2022-11-08 DIAGNOSIS — R73.09 ELEVATED GLUCOSE: ICD-10-CM

## 2022-11-08 DIAGNOSIS — Q04.6 COLLOID CYST OF THIRD VENTRICLE (H): ICD-10-CM

## 2022-11-08 DIAGNOSIS — K21.9 GASTROESOPHAGEAL REFLUX DISEASE WITHOUT ESOPHAGITIS: ICD-10-CM

## 2022-11-08 DIAGNOSIS — D35.2 PITUITARY ADENOMA (H): ICD-10-CM

## 2022-11-08 DIAGNOSIS — I10 ESSENTIAL HYPERTENSION WITH GOAL BLOOD PRESSURE LESS THAN 140/90: ICD-10-CM

## 2022-11-08 DIAGNOSIS — Z00.00 ROUTINE GENERAL MEDICAL EXAMINATION AT A HEALTH CARE FACILITY: Primary | ICD-10-CM

## 2022-11-08 DIAGNOSIS — Z12.11 SCREEN FOR COLON CANCER: ICD-10-CM

## 2022-11-08 LAB — HBA1C MFR BLD: 5.5 % (ref 0–5.6)

## 2022-11-08 PROCEDURE — 99213 OFFICE O/P EST LOW 20 MIN: CPT | Mod: 25 | Performed by: FAMILY MEDICINE

## 2022-11-08 PROCEDURE — 90471 IMMUNIZATION ADMIN: CPT | Performed by: FAMILY MEDICINE

## 2022-11-08 PROCEDURE — 99396 PREV VISIT EST AGE 40-64: CPT | Mod: 25 | Performed by: FAMILY MEDICINE

## 2022-11-08 PROCEDURE — 90682 RIV4 VACC RECOMBINANT DNA IM: CPT | Performed by: FAMILY MEDICINE

## 2022-11-08 RX ORDER — LISINOPRIL 10 MG/1
10 TABLET ORAL DAILY
Qty: 90 TABLET | Refills: 3 | Status: SHIPPED | OUTPATIENT
Start: 2022-11-08 | End: 2023-12-05

## 2022-11-08 ASSESSMENT — ENCOUNTER SYMPTOMS
PALPITATIONS: 0
DIARRHEA: 0
NAUSEA: 0
HEADACHES: 0
HEMATOCHEZIA: 0
JOINT SWELLING: 0
HEARTBURN: 0
WEAKNESS: 0
CHILLS: 0
ABDOMINAL PAIN: 0
BREAST MASS: 0
ARTHRALGIAS: 0
DIZZINESS: 0
DYSURIA: 0
FREQUENCY: 0
MYALGIAS: 1
EYE PAIN: 0
PARESTHESIAS: 0
CONSTIPATION: 0
SHORTNESS OF BREATH: 0
COUGH: 0
SORE THROAT: 0
FEVER: 0
NERVOUS/ANXIOUS: 0
HEMATURIA: 0

## 2022-11-08 ASSESSMENT — PAIN SCALES - GENERAL: PAINLEVEL: MODERATE PAIN (4)

## 2022-11-08 NOTE — PROGRESS NOTES
SUBJECTIVE:   CC: Josephine is an 62 year old who presents for preventive health visit.         Healthy Habits:     Getting at least 3 servings of Calcium per day:  Yes    Bi-annual eye exam:  Yes    Dental care twice a year:  Yes    Sleep apnea or symptoms of sleep apnea:  None    Diet:  Regular (no restrictions) and Carbohydrate counting    Frequency of exercise:  4-5 days/week    Duration of exercise:  30-45 minutes    Taking medications regularly:  Yes    Medication side effects:  None    PHQ-2 Total Score: 0    Additional concerns today:  Yes      Left shoulder and neck pain after carrying in groceries  Has happened before  Will let me know if it does not improve, consider PT  Seems to be from the neck   radiates to upper left arm and throbs at night  Recommend starting with ibuprofen as needed, consider PT    The 10-year ASCVD risk score (Sivakumar QUIJANO, et al., 2019) is: 6.1%    Values used to calculate the score:      Age: 62 years      Sex: Female      Is Non- : No      Diabetic: No      Tobacco smoker: No      Systolic Blood Pressure: 139 mmHg      Is BP treated: Yes      HDL Cholesterol: 64 mg/dL      Total Cholesterol: 211 mg/dL    BP at goal on meds      Today's PHQ-2 Score:   PHQ-2 ( 1999 Pfizer) 11/8/2022   Q1: Little interest or pleasure in doing things 0   Q2: Feeling down, depressed or hopeless 0   PHQ-2 Score 0   PHQ-2 Total Score (12-17 Years)- Positive if 3 or more points; Administer PHQ-A if positive -   Q1: Little interest or pleasure in doing things Not at all   Q2: Feeling down, depressed or hopeless Not at all   PHQ-2 Score 0       Abuse: Current or Past (Physical, Sexual or Emotional) - No  Do you feel safe in your environment? Yes        Social History     Tobacco Use     Smoking status: Former     Packs/day: 0.00     Years: 0.00     Pack years: 0.00     Types: Cigarettes     Quit date: 1/24/2012     Years since quitting: 10.7     Smokeless tobacco: Never   Substance Use  Topics     Alcohol use: Yes     Comment: GLASS OF WINE EVERY NIGHT     If you drink alcohol do you typically have >3 drinks per day or >7 drinks per week? No    Alcohol Use 11/8/2022   Prescreen: >3 drinks/day or >7 drinks/week? No   Prescreen: >3 drinks/day or >7 drinks/week? -       Reviewed orders with patient.  Reviewed health maintenance and updated orders accordingly - Yes  Lab work is in process    Breast Cancer Screening:  Any new diagnosis of family breast, ovarian, or bowel cancer? No    FHS-7:   Breast CA Risk Assessment (FHS-7) 8/11/2021 10/4/2021 11/2/2022 11/8/2022   Did any of your first-degree relatives have breast or ovarian cancer? Yes Yes Yes Yes   Did any of your relatives have bilateral breast cancer? No No No No   Did any man in your family have breast cancer? No No No No   Did any woman in your family have breast and ovarian cancer? Yes No No Yes   Did any woman in your family have breast cancer before age 50 y? No No No No   Do you have 2 or more relatives with breast and/or ovarian cancer? No No Yes Yes   Do you have 2 or more relatives with breast and/or bowel cancer? No No No Yes       Mammogram Screening: Recommended mammography every 1-2 years with patient discussion and risk factor consideration  Pertinent mammograms are reviewed under the imaging tab.    History of abnormal Pap smear: Status post hysterectomy. Pap still indicated. no     Reviewed and updated as needed this visit by clinical staff    Allergies  Meds  Problems    Fam Hx          Reviewed and updated as needed this visit by Provider      Problems                Review of Systems   Constitutional: Negative for chills and fever.   HENT: Negative for congestion, ear pain, hearing loss and sore throat.    Eyes: Negative for pain and visual disturbance.   Respiratory: Negative for cough and shortness of breath.    Cardiovascular: Negative for chest pain, palpitations and peripheral edema.   Gastrointestinal: Negative for  "abdominal pain, constipation, diarrhea, heartburn, hematochezia and nausea.   Breasts:  Negative for tenderness, breast mass and discharge.   Genitourinary: Negative for dysuria, frequency, genital sores, hematuria, pelvic pain, urgency, vaginal bleeding and vaginal discharge.   Musculoskeletal: Positive for myalgias. Negative for arthralgias and joint swelling.   Skin: Negative for rash.   Neurological: Negative for dizziness, weakness, headaches and paresthesias.   Psychiatric/Behavioral: Negative for mood changes. The patient is not nervous/anxious.      CONSTITUTIONAL: NEGATIVE for fever, chills, change in weight  INTEGUMENTARY/SKIN: NEGATIVE for worrisome rashes, moles or lesions  EYES: NEGATIVE for vision changes or irritation  ENT: NEGATIVE for ear, mouth and throat problems  RESP: NEGATIVE for significant cough or SOB  BREAST: NEGATIVE for masses, tenderness or discharge  CV: NEGATIVE for chest pain, palpitations or peripheral edema  GI: NEGATIVE for nausea, abdominal pain, heartburn, or change in bowel habits  : NEGATIVE for unusual urinary or vaginal symptoms. No vaginal bleeding.  MUSCULOSKELETAL: NEGATIVE for significant arthralgias or myalgia  NEURO: NEGATIVE for weakness, dizziness or paresthesias  PSYCHIATRIC: NEGATIVE for changes in mood or affect      OBJECTIVE:   /89   Pulse 73   Temp 98.2  F (36.8  C) (Oral)   Resp 16   Ht 1.613 m (5' 3.5\")   Wt 90.3 kg (199 lb)   SpO2 99%   BMI 34.70 kg/m    Physical Exam  GENERAL: healthy, alert and no distress  EYES: Eyes grossly normal to inspection, PERRL and conjunctivae and sclerae normal  HENT: ear canals and TM's normal, nose and mouth without ulcers or lesions  NECK: no adenopathy, no asymmetry, masses, or scars and thyroid normal to palpation  RESP: lungs clear to auscultation - no rales, rhonchi or wheezes  BREAST: normal without masses, tenderness or nipple discharge and no palpable axillary masses or adenopathy  CV: regular rate and " "rhythm, normal S1 S2, no S3 or S4, no murmur, click or rub, no peripheral edema and peripheral pulses strong  ABDOMEN: soft, nontender, no hepatosplenomegaly, no masses and bowel sounds normal  MS: no gross musculoskeletal defects noted, no edema, negative empty pop can and lift off tests. No pain to palpation over cervical spine or soft tissue of upper arm, ? shaheed  SKIN: no suspicious lesions or rashes  NEURO: Normal strength and tone, mentation intact and speech normal  PSYCH: mentation appears normal, affect normal/bright    Diagnostic Test Results:  Labs reviewed in Epic    ASSESSMENT/PLAN:   (Z00.00) Routine general medical examination at a health care facility  (primary encounter diagnosis)  Comment:   Plan:     (M54.12) Cervical radiculopathy  Comment: consider PT if not improving  Plan:     (I10) Essential hypertension with goal blood pressure less than 140/90  Comment: at goal on meds, will refill  Plan: lisinopril (ZESTRIL) 10 MG tablet            (K21.9) Gastroesophageal reflux disease without esophagitis  Comment:   Plan: omeprazole (PRILOSEC) 20 MG DR capsule        Working well      Colloid cyst of third ventricle  Stable per last MRI    Pituitary Adenoma  Has follow-up with neurosurg      (R73.09) Elevated glucose  Comment: await a1c  Plan: Hemoglobin A1c            (Z12.11) Screen for colon cancer  Comment:   Plan: Colonoscopy Screening  Referral              Patient has been advised of split billing requirements and indicates understanding: Yes      COUNSELING:  Reviewed preventive health counseling, as reflected in patient instructions       Regular exercise       Healthy diet/nutrition       Vision screening       Colorectal Cancer Screening    Estimated body mass index is 34.7 kg/m  as calculated from the following:    Height as of this encounter: 1.613 m (5' 3.5\").    Weight as of this encounter: 90.3 kg (199 lb).    Weight management plan: Discussed healthy diet and exercise " guidelines    She reports that she quit smoking about 10 years ago. Her smoking use included cigarettes. She has never used smokeless tobacco.      Counseling Resources:  ATP IV Guidelines  Pooled Cohorts Equation Calculator  Breast Cancer Risk Calculator  BRCA-Related Cancer Risk Assessment: FHS-7 Tool  FRAX Risk Assessment  ICSI Preventive Guidelines  Dietary Guidelines for Americans, 2010  [employer]'s MyPlate  ASA Prophylaxis  Lung CA Screening    Greer Sanchez MD  [facility]

## 2023-01-24 ENCOUNTER — IMMUNIZATION (OUTPATIENT)
Dept: FAMILY MEDICINE | Facility: CLINIC | Age: 63
End: 2023-01-24

## 2023-01-24 DIAGNOSIS — Z23 HIGH PRIORITY FOR 2019-NCOV VACCINE: Primary | ICD-10-CM

## 2023-01-24 PROCEDURE — 91312 COVID-19 VACCINE BIVALENT BOOSTER 12+ (PFIZER): CPT

## 2023-01-24 PROCEDURE — 99207 PR NO CHARGE NURSE ONLY: CPT

## 2023-01-24 PROCEDURE — 0124A COVID-19 VACCINE BIVALENT BOOSTER 12+ (PFIZER): CPT

## 2023-03-06 NOTE — TELEPHONE ENCOUNTER
I called the patient and LM reading the providers note below.  Clinic number given.  Patient has not read her MyChart message yet.  Letter mailed.  Felicia Thomas,     
Not seen in 1.5 years  Have her schedule video visit.     Greer Sanchez MD    
Routing refill request to provider for review/approval because:  Labs not current:  Creatinine, potassium    Sandee OLIVON, RN          
[employer] message sent.  Felicia Thomas,     
Spontaneous, unlabored and symmetrical

## 2023-03-29 ENCOUNTER — TELEPHONE (OUTPATIENT)
Dept: GASTROENTEROLOGY | Facility: CLINIC | Age: 63
End: 2023-03-29
Payer: COMMERCIAL

## 2023-03-29 ENCOUNTER — TELEPHONE (OUTPATIENT)
Dept: SURGERY | Facility: CLINIC | Age: 63
End: 2023-03-29
Payer: COMMERCIAL

## 2023-03-29 NOTE — TELEPHONE ENCOUNTER
Screening Questions  BLUE  KIND OF PREP RED  LOCATION [review exclusion criteria] GREEN  SEDATION TYPE        y Are you active on mychart?       Yannick Ordering/Referring Provider?        [facility] What type of coverage do you have?      n Have you had a positive covid test in the last 14 days?     34.70 1. BMI  [BMI 40+ - review exclusion criteria]    y  2. Are you able to give consent for your medical care? [IF NO,RN REVIEW]          n  3. Are you taking any prescription pain medications on a routine schedule   (ex narcotics: oxycodone, roxicodone, oxycontin,  and percocet)? [RN Review]        n  3a. EXTENDED PREP What kind of prescription?     n 4. Do you have any chemical dependencies such as alcohol, street drugs, or methadone?        **If yes 3- 5 , please schedule with MAC sedation.**          IF YES TO ANY 6 - 10 - HOSPITAL SETTING ONLY.     n 6.   Do you need assistance transferring?     n 7.   Have you had a heart or lung transplant?    n 8.   Are you currently on dialysis?   n 9.   Do you use daily home oxygen?   n 10. Do you take nitroglycerin?   10a. n If yes, how often?     11. [FEMALES]  n Are you currently pregnant?    11a. n If yes, how many weeks? [ Greater than 12 weeks, OR NEEDED]    n 12. Do you have Pulmonary Hypertension? *NEED PAC APPT AT [facility] w/ MAC*     n 13. [review exclusion criteria]  Do you have any implantable devices in your body (pacemaker, defib, LVAD)?    n 14. In the past 6 months, have you had any heart related issues including cardiomyopathy or heart attack?     14a. n If yes, did it require cardiac stenting if so when?     n 15. Have you had a stroke or Transient ischemic attack (TIA - aka  mini stroke ) within 6 months?      n 16. Do you have mod to severe Obstructive Sleep Apnea?  [Hospital only]    n 17. Do you have SEVERE AND UNCONTROLLED asthma? *NEED PAC APPT AT [facility] w/MAC*     18. Are you currently taking any blood thinners?     18a. No. Continue to 19.   18b.  "Yes/no Blood Thinner: No [CONTINUE TO #19]    n 19. Do you take the medication Phentermine?    19a. If yes, \"Hold for 7 days before procedure.  Please consult your prescribing provider if you have questions about holding this medication.\"     n  20. Do you have chronic kidney disease?      n  21. Do you have a diagnosis of diabetes?     n  22. On a regular basis do you go 3-5 days between bowel movements?     y 23. Preferred LOCAL Pharmacy for Pre Prescription    [ LIST ONLY ONE PHARMACY]        [id number] - [facility] [address] [facility] [address] [facility] [address]      - CLOSING REMINDERS -    Informed patient they will need an adult    Cannot take any type of public or medical transportation alone    Conscious Sedation- Needs  for 6 hours after the procedure       MAC/General-Needs  for 24 hours after procedure    Pre-Procedure Covid test to be completed [[facility] PCR Testing Required]    Confirmed Nurse will call to complete assessment       - SCHEDULING DETAILS -  n Hospital Setting Required? If yes, what is the exclusion?: n   Garcia  Surgeon    4/10/23  Date of Procedure  Lower Endoscopy [Colonoscopy]  Type of Procedure Scheduled  [facility]- If you answer yes to questions #8, #20, #21 [  pts ]Which Colonoscopy Prep was Sent?     general Sedation Type     n PAC / Pre-op Required                 "

## 2023-03-31 RX ORDER — BISACODYL 5 MG/1
TABLET, DELAYED RELEASE ORAL
Qty: 4 TABLET | Refills: 0 | Status: SHIPPED | OUTPATIENT
Start: 2023-03-31 | End: 2023-12-05

## 2023-04-07 ENCOUNTER — ANESTHESIA EVENT (OUTPATIENT)
Dept: GASTROENTEROLOGY | Facility: CLINIC | Age: 63
End: 2023-04-07
Payer: COMMERCIAL

## 2023-04-07 RX ORDER — HYDROMORPHONE HCL IN WATER/PF 6 MG/30 ML
0.2 PATIENT CONTROLLED ANALGESIA SYRINGE INTRAVENOUS EVERY 5 MIN PRN
Status: CANCELLED | OUTPATIENT
Start: 2023-04-07

## 2023-04-07 RX ORDER — FENTANYL CITRATE 50 UG/ML
50 INJECTION, SOLUTION INTRAMUSCULAR; INTRAVENOUS EVERY 5 MIN PRN
Status: CANCELLED | OUTPATIENT
Start: 2023-04-07

## 2023-04-07 RX ORDER — FENTANYL CITRATE 50 UG/ML
25 INJECTION, SOLUTION INTRAMUSCULAR; INTRAVENOUS EVERY 5 MIN PRN
Status: CANCELLED | OUTPATIENT
Start: 2023-04-07

## 2023-04-07 RX ORDER — SODIUM CHLORIDE, SODIUM LACTATE, POTASSIUM CHLORIDE, CALCIUM CHLORIDE 600; 310; 30; 20 MG/100ML; MG/100ML; MG/100ML; MG/100ML
INJECTION, SOLUTION INTRAVENOUS CONTINUOUS
Status: CANCELLED | OUTPATIENT
Start: 2023-04-07

## 2023-04-07 RX ORDER — ONDANSETRON 4 MG/1
4 TABLET, ORALLY DISINTEGRATING ORAL EVERY 30 MIN PRN
Status: CANCELLED | OUTPATIENT
Start: 2023-04-07

## 2023-04-07 RX ORDER — HYDROMORPHONE HCL IN WATER/PF 6 MG/30 ML
0.4 PATIENT CONTROLLED ANALGESIA SYRINGE INTRAVENOUS EVERY 5 MIN PRN
Status: CANCELLED | OUTPATIENT
Start: 2023-04-07

## 2023-04-07 RX ORDER — ONDANSETRON 2 MG/ML
4 INJECTION INTRAMUSCULAR; INTRAVENOUS EVERY 30 MIN PRN
Status: CANCELLED | OUTPATIENT
Start: 2023-04-07

## 2023-04-07 NOTE — ANESTHESIA PREPROCEDURE EVALUATION
Anesthesia Pre-Procedure Evaluation    Patient: Josephine Heard   MRN: [id number] : 1960        Procedure : Procedure(s):  Colonoscopy          Past Medical History:   Diagnosis Date     Endometriosis      Fatty liver     mild elevated liver enzymes     GERD (gastroesophageal reflux disease)      HTN (hypertension), benign      IBS (irritable bowel syndrome)      LA (lymphadenopathy) - ? 2012     LA (lymphadenopathy) - ?      Seasonal allergies      Vertigo       Past Surgical History:   Procedure Laterality Date     APPENDECTOMY  2019     BIOPSY       BIOPSY BREAST       COLONOSCOPY  2013    Procedure: COLONOSCOPY;  Colonoscopy, screening;  Surgeon: Gaetano Saravia MD;  Location: [facility] OR     HC LAPAROSCOPIC MYOMECTOMY, 1 - 4 INTRAMURAL MYOMAS =<250 GM       HC REMOVE TONSILS/ADENOIDS,<13 Y/O       HYSTERECTOMY, PAP NO LONGER INDICATED       HYSTERECTOMY, NADER      secondary to menorrhagia     TUBAL/ECTOPIC PREGNANCY  /      Allergies   Allergen Reactions     Contrast Dye       Social History     Tobacco Use     Smoking status: Former     Packs/day: 0.00     Years: 0.00     Pack years: 0.00     Types: Cigarettes     Quit date: 2012     Years since quittin.2     Smokeless tobacco: Never   Vaping Use     Vaping status: Not on file   Substance Use Topics     Alcohol use: Yes     Comment: GLASS OF WINE EVERY NIGHT      Wt Readings from Last 1 Encounters:   22 90.3 kg (199 lb)        Anesthesia Evaluation   Pt has had prior anesthetic. Type: General and MAC.    No history of anesthetic complications       ROS/MED HX  ENT/Pulmonary:     (+) KRISHAN risk factors, hypertension, obese, allergic rhinitis, tobacco use, Past use,     Neurologic:  - neg neurologic ROS     Cardiovascular:     (+) Dyslipidemia hypertension-----    METS/Exercise Tolerance:     Hematologic:  - neg hematologic  ROS     Musculoskeletal:  - neg musculoskeletal ROS     GI/Hepatic: Comment: IBS    (+)  GERD, Asymptomatic on medication, liver disease,     Renal/Genitourinary:  - neg Renal ROS     Endo: Comment: Morbid obesity    (+) Obesity,     Psychiatric/Substance Use:     (+) psychiatric history anxiety     Infectious Disease:  - neg infectious disease ROS     Malignancy:   (+) Malignancy, History of Other.Other CA endometriosis status post.    Other:  - neg other ROS          Physical Exam    Airway  airway exam normal           Respiratory Devices and Support         Dental       (+) Minor Abnormalities - some fillings, tiny chips      Cardiovascular   cardiovascular exam normal          Pulmonary   pulmonary exam normal                OUTSIDE LABS:  CBC:   Lab Results   Component Value Date    WBC 5.8 11/04/2022    WBC 6.1 01/29/2013    HGB 13.8 11/04/2022    HGB 14.0 01/29/2013    HCT 40.0 11/04/2022    HCT 40.1 01/29/2013     11/04/2022     01/29/2013     BMP:   Lab Results   Component Value Date     11/04/2022     08/11/2021    POTASSIUM 4.2 11/04/2022    POTASSIUM 3.9 08/11/2021    CHLORIDE 105 11/04/2022    CHLORIDE 106 08/11/2021    CO2 29 11/04/2022    CO2 25 08/11/2021    BUN 18 11/04/2022    BUN 20 08/11/2021    CR 0.89 11/04/2022    CR 1.01 08/11/2021     (H) 11/04/2022    GLC 92 08/11/2021     COAGS: No results found for: PTT, INR, FIBR  POC: No results found for: BGM, HCG, HCGS  HEPATIC:   Lab Results   Component Value Date    ALBUMIN 4.0 11/04/2022    PROTTOTAL 7.6 11/04/2022    ALT 37 11/04/2022    AST 19 11/04/2022    ALKPHOS 58 11/04/2022    BILITOTAL 0.9 11/04/2022     OTHER:   Lab Results   Component Value Date    A1C 5.5 11/04/2022    SOHA 9.0 11/04/2022    PHOS 2.7 06/17/2015    MAG 1.8 05/20/2009    LIPASE 76 01/29/2013    AMYLASE 64 01/29/2013    TSH 1.54 01/29/2013    T4 1.00 01/04/2010    CRP <5.0 05/20/2009    SED 8 05/20/2009       Anesthesia Plan    ASA Status:  3   NPO Status:  NPO Appropriate    Anesthesia Type: General.     - Airway: Native airway       Maintenance: Balanced.        Consents    Anesthesia Plan(s) and associated risks, benefits, and realistic alternatives discussed. Questions answered and patient/representative(s) expressed understanding.     - Discussed: Risks, Benefits and Alternatives for BOTH SEDATION and the PROCEDURE were discussed     - Discussed with:  Patient         Postoperative Care            Comments:                BRICE Meng CRNA

## 2023-04-10 ENCOUNTER — ANESTHESIA (OUTPATIENT)
Dept: GASTROENTEROLOGY | Facility: CLINIC | Age: 63
End: 2023-04-10
Payer: COMMERCIAL

## 2023-04-10 ENCOUNTER — HOSPITAL ENCOUNTER (OUTPATIENT)
Facility: CLINIC | Age: 63
Discharge: HOME | End: 2023-04-10
Attending: SURGERY | Admitting: SURGERY
Payer: COMMERCIAL

## 2023-04-10 VITALS
RESPIRATION RATE: 16 BRPM | OXYGEN SATURATION: 99 % | DIASTOLIC BLOOD PRESSURE: 90 MMHG | TEMPERATURE: 98.2 F | WEIGHT: 199 LBS | HEART RATE: 74 BPM | HEIGHT: 64 IN | BODY MASS INDEX: 33.97 KG/M2 | SYSTOLIC BLOOD PRESSURE: 143 MMHG

## 2023-04-10 DIAGNOSIS — Z12.11 SPECIAL SCREENING FOR MALIGNANT NEOPLASMS, COLON: Primary | ICD-10-CM

## 2023-04-10 LAB — COLONOSCOPY: NORMAL

## 2023-04-10 PROCEDURE — 370N000017 HC ANESTHESIA TECHNICAL FEE, PER MIN: Performed by: SURGERY

## 2023-04-10 PROCEDURE — 250N000009 HC RX 250: Performed by: NURSE ANESTHETIST, CERTIFIED REGISTERED

## 2023-04-10 PROCEDURE — 88305 TISSUE EXAM BY PATHOLOGIST: CPT | Mod: TC | Performed by: SURGERY

## 2023-04-10 PROCEDURE — 45385 COLONOSCOPY W/LESION REMOVAL: CPT | Performed by: SURGERY

## 2023-04-10 PROCEDURE — 88305 TISSUE EXAM BY PATHOLOGIST: CPT | Mod: 26 | Performed by: PATHOLOGY

## 2023-04-10 PROCEDURE — 258N000003 HC RX IP 258 OP 636: Performed by: SURGERY

## 2023-04-10 PROCEDURE — 250N000011 HC RX IP 250 OP 636: Performed by: NURSE ANESTHETIST, CERTIFIED REGISTERED

## 2023-04-10 PROCEDURE — 45385 COLONOSCOPY W/LESION REMOVAL: CPT | Mod: PT | Performed by: SURGERY

## 2023-04-10 RX ORDER — LIDOCAINE 40 MG/G
CREAM TOPICAL
Status: DISCONTINUED | OUTPATIENT
Start: 2023-04-10 | End: 2023-04-10 | Stop reason: HOSPADM

## 2023-04-10 RX ORDER — GLYCOPYRROLATE 0.2 MG/ML
INJECTION, SOLUTION INTRAMUSCULAR; INTRAVENOUS PRN
Status: DISCONTINUED | OUTPATIENT
Start: 2023-04-10 | End: 2023-04-10

## 2023-04-10 RX ORDER — SODIUM CHLORIDE, SODIUM LACTATE, POTASSIUM CHLORIDE, CALCIUM CHLORIDE 600; 310; 30; 20 MG/100ML; MG/100ML; MG/100ML; MG/100ML
INJECTION, SOLUTION INTRAVENOUS CONTINUOUS
Status: DISCONTINUED | OUTPATIENT
Start: 2023-04-10 | End: 2023-04-10 | Stop reason: HOSPADM

## 2023-04-10 RX ORDER — SODIUM CHLORIDE, SODIUM LACTATE, POTASSIUM CHLORIDE, CALCIUM CHLORIDE 600; 310; 30; 20 MG/100ML; MG/100ML; MG/100ML; MG/100ML
INJECTION, SOLUTION INTRAVENOUS CONTINUOUS PRN
Status: DISCONTINUED | OUTPATIENT
Start: 2023-04-10 | End: 2023-04-10

## 2023-04-10 RX ORDER — PROPOFOL 10 MG/ML
INJECTION, EMULSION INTRAVENOUS CONTINUOUS PRN
Status: DISCONTINUED | OUTPATIENT
Start: 2023-04-10 | End: 2023-04-10

## 2023-04-10 RX ADMIN — GLYCOPYRROLATE 0.3 MG: 0.2 INJECTION, SOLUTION INTRAMUSCULAR; INTRAVENOUS at 12:11

## 2023-04-10 RX ADMIN — SODIUM CHLORIDE, POTASSIUM CHLORIDE, SODIUM LACTATE AND CALCIUM CHLORIDE: 600; 310; 30; 20 INJECTION, SOLUTION INTRAVENOUS at 12:06

## 2023-04-10 RX ADMIN — PROPOFOL 200 MCG/KG/MIN: 10 INJECTION, EMULSION INTRAVENOUS at 12:11

## 2023-04-10 RX ADMIN — SODIUM CHLORIDE, POTASSIUM CHLORIDE, SODIUM LACTATE AND CALCIUM CHLORIDE 30 ML: 600; 310; 30; 20 INJECTION, SOLUTION INTRAVENOUS at 12:05

## 2023-04-10 ASSESSMENT — LIFESTYLE VARIABLES: TOBACCO_USE: 1

## 2023-04-10 ASSESSMENT — ACTIVITIES OF DAILY LIVING (ADL): ADLS_ACUITY_SCORE: 35

## 2023-04-10 NOTE — ANESTHESIA CARE TRANSFER NOTE
Patient: Josephine Heard    Procedure: Procedure(s):  COLONOSCOPY, FLEXIBLE, WITH LESION REMOVAL USING SNARE       Diagnosis: Screen for colon cancer [Z12.11]  Diagnosis Additional Information: No value filed.    Anesthesia Type:   General     Note:    Oropharynx: oropharynx clear of all foreign objects and spontaneously breathing  Level of Consciousness: drowsy and awake  Oxygen Supplementation: room air    Independent Airway: airway patency satisfactory and stable  Dentition: dentition unchanged  Vital Signs Stable: post-procedure vital signs reviewed and stable  Report to RN Given: handoff report given  Patient transferred to: Phase II    Handoff Report: Identifed the Patient, Identified the Reponsible Provider, Reviewed the pertinent medical history, Discussed the surgical course, Reviewed Intra-OP anesthesia mangement and issues during anesthesia, Set expectations for post-procedure period and Allowed opportunity for questions and acknowledgement of understanding      Vitals:  Vitals Value Taken Time   BP     Temp     Pulse     Resp     SpO2         Electronically Signed By: BRICE Ross CRNA  April 10, 2023  12:26 PM

## 2023-04-10 NOTE — ANESTHESIA CARE TRANSFER NOTE
Patient: Josephine Heard    Procedure: Procedure(s):  COLONOSCOPY, FLEXIBLE, WITH LESION REMOVAL USING SNARE       Diagnosis: Screen for colon cancer [Z12.11]  Diagnosis Additional Information: No value filed.    Anesthesia Type:   General     Note:    Oropharynx: oropharynx clear of all foreign objects and spontaneously breathing  Level of Consciousness: awake  Oxygen Supplementation: room air    Independent Airway: airway patency satisfactory and stable  Dentition: dentition unchanged  Vital Signs Stable: post-procedure vital signs reviewed and stable  Report to RN Given: handoff report given  Patient transferred to: Phase II    Handoff Report: Identifed the Patient, Identified the Reponsible Provider, Reviewed the pertinent medical history, Discussed the surgical course, Reviewed Intra-OP anesthesia mangement and issues during anesthesia, Set expectations for post-procedure period and Allowed opportunity for questions and acknowledgement of understanding      Vitals:  Vitals Value Taken Time   BP     Temp     Pulse     Resp     SpO2 99 % 04/10/23 1233   Vitals shown include unvalidated device data.    Electronically Signed By: BRICE Ross CRNA  April 10, 2023  12:34 PM

## 2023-04-10 NOTE — ANESTHESIA POSTPROCEDURE EVALUATION
Patient: Josephine WILSON Félix    Procedure: Procedure(s):  COLONOSCOPY, FLEXIBLE, WITH LESION REMOVAL USING SNARE       Anesthesia Type:  General    Note:  Disposition: Outpatient   Postop Pain Control: Uneventful            Sign Out: Well controlled pain   PONV: No   Neuro/Psych: Uneventful            Sign Out: Acceptable/Baseline neuro status   Airway/Respiratory: Uneventful            Sign Out: Acceptable/Baseline resp. status   CV/Hemodynamics: Uneventful            Sign Out: Acceptable CV status; No obvious hypovolemia; No obvious fluid overload   Other NRE: NONE   DID A NON-ROUTINE EVENT OCCUR? No           Last vitals:  Vitals Value Taken Time   /89 04/10/23 1234   Temp     Pulse 73 04/10/23 1234   Resp 16 04/10/23 1234   SpO2 97 % 04/10/23 1236   Vitals shown include unvalidated device data.    Electronically Signed By: BRICE Ross CRNA  April 10, 2023  12:37 PM

## 2023-04-10 NOTE — LETTER
Josephine Heard  [address] [facility] [address]    April 14, 2023    Dear Josephine,  This letter is written to inform you of the results of your recent colonoscopy.  Your examination showed polyp(s) in your sigmoid colon. All polyps were removed in their entirety and sent for review by a pathologist. As you will see on the pathology report below, the tissue(s) were tubular adenomatous polyps. Your examination was otherwise without abnormality.    Final Diagnosis   Colon, sigmoid: Polypectomy:  - Tubular adenoma  - Hyperplastic polyp   - No evidence of high-grade dysplasia or invasive malignancy         Adenomatous polyps are entirely benign (non-cancerous); however, patients who have developed these polyps are at an increased risk for developing additional polyps in the future. If these are not eventually removed, there is a risk of developing colon cancer. We will advise more frequent examinations with you because of the risk associated with this type of polyp.    Given these findings,  I recommend that you undergo a repeat colonoscopy in 5 year(s) for surveillance. We will enter you into a recall system so you receive a reminder closer to the time that you are due for repeat examination.     Please remember that this recommendation is made with the understanding that you are not experiencing persistent changes in bowel function, bleeding per rectum, and/or significant abdominal pain. If you experience these symptoms, please contact your primary care provider for a further evaluation.     If you have any questions or concerns about the results of your colonoscopy or the appropriate follow-up, please contact my assistant at [phone].          Sincerely,        [facility],   [place]  ___

## 2023-04-10 NOTE — H&P
[facility]    Pre-Endoscopy History and Physical     Josephine Heard MRN# [id number]   YOB: 1960 Age: 62 year old     Date of Procedure: 4/10/2023  Primary care provider: Greer Sanchez  Type of Endoscopy: Colonoscopy with possible biopsy, possible polypectomy  Reason for Procedure: screening  Type of Anesthesia Anticipated: MAC    HPI:    Josephine is a 62 year old female who will be undergoing the above procedure.  last colon 2013 (nl); no blood thinner; no famhx of colon cancer    A history and physical has been performed. The patient's medications and allergies have been reviewed. The risks and benefits of the procedure and the sedation options and risks were discussed with the patient.  All questions were answered and informed consent was obtained.      She denies a personal or family history of anesthesia complications or bleeding disorders.     Patient Active Problem List   Diagnosis     Colloid cyst of third ventricle (H)     Anxiety     Fatty liver     Seasonal allergies     CARDIOVASCULAR SCREENING; LDL GOAL LESS THAN 130     GERD (gastroesophageal reflux disease)     [facility]     RUQ abdominal pain     Vitamin D deficiency     Obesity     HCD (health care directive)     Essential hypertension with goal blood pressure less than 140/90     Non morbid obesity, unspecified obesity type        Past Medical History:   Diagnosis Date     Endometriosis      Fatty liver     mild elevated liver enzymes     GERD (gastroesophageal reflux disease)      HTN (hypertension), benign      IBS (irritable bowel syndrome)      LA (lymphadenopathy) - ? 05/16/2012     LA (lymphadenopathy) - ?      Seasonal allergies      Vertigo         Past Surgical History:   Procedure Laterality Date     APPENDECTOMY  07/29/2019     BIOPSY       BIOPSY BREAST       COLONOSCOPY  02/19/2013    Procedure: COLONOSCOPY;  Colonoscopy, screening;  Surgeon: Gaetano Saravia MD;  Location:  [facility] OR     HC LAPAROSCOPIC MYOMECTOMY, 1 - 4 INTRAMURAL MYOMAS =<250 GM       HC REMOVE TONSILS/ADENOIDS,<13 Y/O       HYSTERECTOMY, PAP NO LONGER INDICATED       HYSTERECTOMY, NADER      secondary to menorrhagia     TUBAL/ECTOPIC PREGNANCY         Social History     Tobacco Use     Smoking status: Former     Packs/day: 0.00     Years: 0.00     Pack years: 0.00     Types: Cigarettes     Quit date: 2012     Years since quittin.2     Smokeless tobacco: Never   Vaping Use     Vaping status: Not on file   Substance Use Topics     Alcohol use: Yes     Comment: GLASS OF WINE EVERY NIGHT       Family History   Problem Relation Age of Onset     Eye Disorder Mother         GLAUCOMA     Gastrointestinal Disease Mother         TWISTED BOWEL     Gynecology Mother         UTERINE CA /BREAST     Neurologic Disorder Mother         Migraines     Breast Cancer Mother      Glaucoma Mother      Dementia Mother      Diabetes Father      Blood Disease Sister         HEPATITIS C     Unknown/Adopted Sister      Breast Cancer Sister      Eye Disorder Maternal Grandmother         GLAUCOMA     Cerebrovascular Disease Maternal Grandmother      Glaucoma Maternal Grandmother      Diabetes Maternal Grandfather      Cerebrovascular Disease Maternal Grandfather        Prior to Admission medications    Medication Sig Start Date End Date Taking? Authorizing Provider   bisacodyl (DULCOLAX) 5 MG EC tablet Take 2 tablets at 3 pm the day before your procedure. If your procedure is before 11 am, take 2 additional tablets at 11 pm. If your procedure is after 11 am, take 2 additional tablets at 6 am. For additional instructions refer to your colonoscopy prep instructions. 3/31/23  Yes Rafael Garcia MD   Black Cohosh 40 MG CAPS Take  by mouth.   Yes Reported, Patient   CALCIUM 600 + D OR ONCE A DAY   Yes Reported, Patient   cetirizine (ZYRTEC) 10 MG tablet Take 10 mg by mouth daily   Yes Reported, Patient   diazepam (VALIUM) 2 MG  "tablet Take 1-2 tablets 1 hour before MRI. Do not drive while under the influence. 8/16/21  Yes Greer Sanchez MD   EVENING PRIMROSE OIL PO Take  by mouth.   Yes Reported, Patient   FISH OIL ONCE A DAY   Yes Reported, Patient   fluticasone (FLONASE) 50 MCG/ACT nasal spray Spray 1-2 sprays into both nostrils daily 9/3/14  Yes Greer Sanchez MD   IBUPROFEN 200 MG OR TABS 1-2 TABLETS on as needed basis   Yes Reported, Patient   lisinopril (ZESTRIL) 10 MG tablet Take 1 tablet (10 mg) by mouth daily 11/8/22  Yes Greer Sanchez MD   meclizine (ANTIVERT) 12.5 MG tablet Take 2 tablets (25 mg) by mouth 3 times daily as needed 12/18/17  Yes Greer Sanchez MD   MIRALAX OR AS NEEDED   Yes Reported, Patient   omeprazole (PRILOSEC) 20 MG DR capsule TAKE 1 CAPSULE(20 MG) BY MOUTH TWICE DAILY, 11/8/22  Yes Greer Sanchez MD   polyethylene glycol (GOLYTELY) 236 g suspension The night before the exam at 6 pm drink an 8-ounce glass every 15 minutes until the jug is half empty. If you arrive before 11 AM: Drink the other half of the Golytely jug at 11 PM night before procedure. If you arrive after 11 AM: Drink the other half of the Golytely jug at 6 AM day of procedure. For additional instructions refer to your colonoscopy prep instructions. 3/31/23  Yes Rafael Garcia MD   VITAMIN D, CHOLECALCIFEROL, PO Take 1,000 Units by mouth daily   Yes Reported, Patient       Allergies   Allergen Reactions     Contrast Dye         REVIEW OF SYSTEMS:   5 point ROS negative except as noted above in HPI, including Gen., Resp., CV, GI &  system review.    PHYSICAL EXAM:   BP (!) 181/102 (BP Location: Right arm)   Pulse 72   Temp 98.2  F (36.8  C) (Oral)   Ht 1.613 m (5' 3.5\")   Wt 90.3 kg (199 lb)   SpO2 99%   BMI 34.70 kg/m   Estimated body mass index is 34.7 kg/m  as calculated from the following:    Height as of this encounter: 1.613 m (5' 3.5\").    Weight as of this encounter: 90.3 kg (199 lb). "   Constitutional: Awake, alert, no acute distress.  Eyes: No scleral icterus.  Conjunctiva are without injection.  ENMT: Mucous membranes moist, dentition and gums are intact.   Neck: Soft, supple, trachea midline.    Endocrine: n/a   Lymphatic: There is no cervical, submandibularadenopathy.  Respiratory: normal effortgs   Cardiovascular: S1, S2  Abdomen: Non-distended, non-tender,  No masses,  Musculoskeletal: No spinal or CVA tenderness. Full range of motion in the upper and lower extremities.    Skin: No skin rashes or lesions to inspection.  No petechia.    Neurologic: alerted and oriented 3x  Psychiatric: The patient's affect is not blunted and mood is appropriate.  DIAGNOSTICS:    Not indicated    IMPRESSION   ASA Class 2 - Mild systemic disease    PLAN:   Plan for Colonoscopy with possible biopsy, possible polypectomy. We discussed the risks, benefits and alternatives and the patient wished to proceed.  Patient is cleared for the above procedure.    The above has been forwarded to the consulting provider.    [facility], [facility]

## 2023-04-12 LAB
PATH REPORT.COMMENTS IMP SPEC: NORMAL
PATH REPORT.COMMENTS IMP SPEC: NORMAL
PATH REPORT.FINAL DX SPEC: NORMAL
PATH REPORT.GROSS SPEC: NORMAL
PATH REPORT.MICROSCOPIC SPEC OTHER STN: NORMAL
PATH REPORT.RELEVANT HX SPEC: NORMAL
PHOTO IMAGE: NORMAL

## 2023-05-08 ENCOUNTER — NURSE TRIAGE (OUTPATIENT)
Dept: NURSING | Facility: CLINIC | Age: 63
End: 2023-05-08
Payer: COMMERCIAL

## 2023-05-08 NOTE — TELEPHONE ENCOUNTER
Nurse Triage SBAR    Is this a 2nd Level Triage? YES, LICENSED PRACTITIONER REVIEW IS REQUIRED    Situation: Tick bite    Background: Patient found a tick on her leg at lunch today. States that it was not there at 2100 last night. She was able to remove it and is confident she got the head out. States now there is a dark brown spot there the tick was with a 1/2 in area of redness around the dark spot.     Assessment: Tick bite    Protocol Recommended Disposition:   See in Office Today    Recommendation:     Patient would like to be seen today. Please contact her with any further recommendations. Care advice given per protocol and callback precautions discussed.      Routed to provider     MARIANA HOFFMAN RN      Does the patient meet one of the following criteria for ADS visit consideration? 16+ years old, with an [facility] PCP     TIP  Providers, please consider if this condition is appropriate for management at one of our Acute and Diagnostic Services sites.     If patient is a good candidate, please use dotphrase <dot>triageresponse and select Refer to ADS to document.    Reason for Disposition    Red ring or bull's-eye rash occurs around a deer tick bite    Additional Information    Negative: Not a tick bite    Negative: Patient sounds very sick or weak to the triager    Negative: Fever or severe headache occurs, 2 to 14 days following the bite    Negative: Widespread rash occurs, 2 to 14 days following the bite    Negative: Can't remove live tick (after using Care Advice)    Negative: Fever and spreading red area or streak    Negative: Fever and area is very tender to touch    Negative: Red streak or red line and length > 2 inches (5 cm)    Negative: Red or very tender (to touch) area and started over 24 hours after the bite    Protocols used: TICK BITE-A-OH

## 2023-05-15 ENCOUNTER — OFFICE VISIT (OUTPATIENT)
Dept: FAMILY MEDICINE | Facility: CLINIC | Age: 63
End: 2023-05-15
Payer: COMMERCIAL

## 2023-05-15 VITALS
WEIGHT: 199 LBS | RESPIRATION RATE: 16 BRPM | HEIGHT: 64 IN | OXYGEN SATURATION: 99 % | BODY MASS INDEX: 33.97 KG/M2 | DIASTOLIC BLOOD PRESSURE: 82 MMHG | SYSTOLIC BLOOD PRESSURE: 134 MMHG | TEMPERATURE: 97.4 F | HEART RATE: 64 BPM

## 2023-05-15 DIAGNOSIS — L98.9 SKIN LESION OF RIGHT LEG: Primary | ICD-10-CM

## 2023-05-15 PROCEDURE — 99213 OFFICE O/P EST LOW 20 MIN: CPT | Performed by: NURSE PRACTITIONER

## 2023-05-15 ASSESSMENT — PAIN SCALES - GENERAL: PAINLEVEL: NO PAIN (1)

## 2023-05-15 NOTE — PROGRESS NOTES
"  Assessment & Plan     Skin lesion of right leg  Soft mobile mass to right leg just superior to medial aspect of popliteal fossa without overlying erythema or fluctuance. Suspect this is a cyst, however patient has never noticed prior to past few days. Will obtain US for further evaluation.   - US Lower Extremity Non Vascular Right; Future         BMI:   Estimated body mass index is 34.7 kg/m  as calculated from the following:    Height as of this encounter: 1.613 m (5' 3.5\").    Weight as of this encounter: 90.3 kg (199 lb).       Patient Instructions   To schedule ultrasound, call [phone].  I will let you know what it shows.        Roxy Stewart, BRICE CNP  [facility]    Juan Burton is a 62 year old, presenting for the following health issues:  Mass (Behind right knee)      Mass    History of Present Illness       Reason for visit:  Cycst on back of knee  Symptom onset:  Today  Symptoms include:  Lump  Symptom intensity:  Mild  Symptom progression:  Staying the same  Had these symptoms before:  No    She eats 2-3 servings of fruits and vegetables daily.She consumes 0 sweetened beverage(s) daily.She exercises with enough effort to increase her heart rate 20 to 29 minutes per day.  She exercises with enough effort to increase her heart rate 6 days per week.   She is taking medications regularly.       Above HPI reviewed. Additionally, noticed a cystlike lesion to the posterior right leg near the popliteal fossa yesterday, it is relatively large, and she has never previously noted this.  It is not necessarily painful.  No overlying erythema.  No fevers, no ill symptoms.      Review of Systems   Constitutional, HEENT, cardiovascular, pulmonary, gi and gu systems are negative, except as otherwise noted.      Objective    /82   Pulse 64   Temp 97.4  F (36.3  C) (Tympanic)   Resp 16   Ht 1.613 m (5' 3.5\")   Wt 90.3 kg (199 lb)   SpO2 99%   BMI 34.70 kg/m    Body mass " index is 34.7 kg/m .  Physical Exam  Vitals and nursing note reviewed.   Constitutional:       General: She is not in acute distress.     Appearance: Normal appearance.   HENT:      Head: Normocephalic and atraumatic.      Mouth/Throat:      Mouth: Mucous membranes are moist.   Cardiovascular:      Rate and Rhythm: Normal rate.   Pulmonary:      Effort: Pulmonary effort is normal.   Musculoskeletal:      Cervical back: Neck supple.      Comments: ~4cm soft mobile mass to medial aspect of right leg just superior to popliteal fossa, non tender, no fluctuance, no overlying erythema.   Skin:     General: Skin is warm and dry.   Neurological:      General: No focal deficit present.      Mental Status: She is alert.   Psychiatric:         Mood and Affect: Mood normal.         Behavior: Behavior normal.

## 2023-05-18 ENCOUNTER — HOSPITAL ENCOUNTER (OUTPATIENT)
Dept: ULTRASOUND IMAGING | Facility: CLINIC | Age: 63
Discharge: HOME | End: 2023-05-18
Attending: NURSE PRACTITIONER | Admitting: NURSE PRACTITIONER
Payer: COMMERCIAL

## 2023-05-18 DIAGNOSIS — L98.9 SKIN LESION OF RIGHT LEG: ICD-10-CM

## 2023-05-18 PROCEDURE — 76882 US LMTD JT/FCL EVL NVASC XTR: CPT | Mod: RT

## 2023-10-10 ENCOUNTER — OFFICE VISIT (OUTPATIENT)
Dept: FAMILY MEDICINE | Facility: CLINIC | Age: 63
End: 2023-10-10
Payer: COMMERCIAL

## 2023-10-10 VITALS
RESPIRATION RATE: 20 BRPM | BODY MASS INDEX: 34.66 KG/M2 | WEIGHT: 195.6 LBS | HEIGHT: 63 IN | DIASTOLIC BLOOD PRESSURE: 92 MMHG | TEMPERATURE: 97.2 F | OXYGEN SATURATION: 99 % | SYSTOLIC BLOOD PRESSURE: 150 MMHG | HEART RATE: 68 BPM

## 2023-10-10 DIAGNOSIS — I10 ESSENTIAL HYPERTENSION WITH GOAL BLOOD PRESSURE LESS THAN 140/90: ICD-10-CM

## 2023-10-10 DIAGNOSIS — Z23 NEED FOR VACCINATION: ICD-10-CM

## 2023-10-10 DIAGNOSIS — L72.0 CYST OF SKIN AND SUBCUTANEOUS TISSUE: Primary | ICD-10-CM

## 2023-10-10 PROCEDURE — 90471 IMMUNIZATION ADMIN: CPT | Performed by: NURSE PRACTITIONER

## 2023-10-10 PROCEDURE — 90682 RIV4 VACC RECOMBINANT DNA IM: CPT | Performed by: NURSE PRACTITIONER

## 2023-10-10 PROCEDURE — 90480 ADMN SARSCOV2 VAC 1/ONLY CMP: CPT | Performed by: NURSE PRACTITIONER

## 2023-10-10 PROCEDURE — 99213 OFFICE O/P EST LOW 20 MIN: CPT | Mod: 25 | Performed by: NURSE PRACTITIONER

## 2023-10-10 PROCEDURE — 91320 SARSCV2 VAC 30MCG TRS-SUC IM: CPT | Performed by: NURSE PRACTITIONER

## 2023-10-10 ASSESSMENT — PAIN SCALES - GENERAL: PAINLEVEL: NO PAIN (0)

## 2023-10-10 NOTE — PATIENT INSTRUCTIONS
Bring blood pressure monitor to next annual physical.    Goal is to have blood pressure < 140/90.    BP Readings from Last 3 Encounters:   10/10/23 (!) 152/102   05/15/23 134/82   04/10/23 (!) 143/90     Yanna Melchor DNP

## 2023-10-10 NOTE — PROGRESS NOTES
"  Assessment & Plan     Cyst of skin and subcutaneous tissue  Discussed cyst benign nature, treatment and monitoring.    Follow-up with PCP for recheck.      Essential hypertension with goal blood pressure less than 140/90  Elevated reading today. Follow-up with PCP    Need for vaccination     - INFLUENZA VACCINE 18-64Y (FLUBLOK)  - COVID-19 12+ (2023-24) (PFIZER)     BMI:   Estimated body mass index is 34.38 kg/m  as calculated from the following:    Height as of this encounter: 1.607 m (5' 3.25\").    Weight as of this encounter: 88.7 kg (195 lb 9.6 oz).   Weight management plan: Patient was referred to their PCP to discuss a diet and exercise plan.    See Patient Instructions    Yanna Melchor [facility] is a 62 year old, presenting for the following health issues:  Derm Problem (Possible cyst on  Mid Abdomen , just wants checked and to discuss next steps ) and Imm/Inj (Pfizer covid and flu shot )        10/10/2023    10:07 AM   Additional Questions   Roomed by Michael SEBASTIAN CMA   Accompanied by self       Imm/Inj    History of Present Illness       Reason for visit:  Peas size spot 4 inches above belly button  Symptom onset:  1-2 weeks ago  Symptoms include:  Spot hurts when touched  Symptom intensity:  Mild  Symptom progression:  Improving  Had these symptoms before:  No  What makes it worse:  Constantly touching or pushing on it    She eats 2-3 servings of fruits and vegetables daily.She consumes 0 sweetened beverage(s) daily.She exercises with enough effort to increase her heart rate 20 to 29 minutes per day.  She exercises with enough effort to increase her heart rate 7 days per week.   She is taking medications regularly.    No change in skin lesions. Just wants to make sure there is nothing wrong and no need for imaging.    Review of Systems   Constitutional, HEENT, cardiovascular, pulmonary, GI, , musculoskeletal, neuro, skin, endocrine and psych systems " "are negative, except as otherwise noted.      Objective    BP (!) 152/102 (BP Location: Left arm, Patient Position: Sitting, Cuff Size: Adult Regular)   Pulse 68   Temp 97.2  F (36.2  C) (Tympanic)   Resp 20   Ht 1.607 m (5' 3.25\")   Wt 88.7 kg (195 lb 9.6 oz)   SpO2 99%   BMI 34.38 kg/m    Body mass index is 34.38 kg/m .  Physical Exam   GENERAL: healthy, alert and no distress  SKIN: Upper abdomen with small pea size lump, mobile, round.                 "

## 2023-10-26 ENCOUNTER — TELEPHONE (OUTPATIENT)
Dept: FAMILY MEDICINE | Facility: CLINIC | Age: 63
End: 2023-10-26
Payer: COMMERCIAL

## 2023-10-26 NOTE — TELEPHONE ENCOUNTER
Patient Quality Outreach    Patient is due for the following:   Hypertension -  BP check    Next Steps:   Schedule a nurse only visit for bp check    Type of outreach:    Sent [employer] message.      Questions for provider review:    None           Tatiana Rivas MA

## 2023-12-02 ASSESSMENT — ENCOUNTER SYMPTOMS
ARTHRALGIAS: 0
HEMATOCHEZIA: 0
HEMATURIA: 0
EYE PAIN: 0
CHILLS: 0
NAUSEA: 0
CONSTIPATION: 0
PARESTHESIAS: 0
HEADACHES: 0
BREAST MASS: 0
FEVER: 0
JOINT SWELLING: 0
HEARTBURN: 0
NERVOUS/ANXIOUS: 0
PALPITATIONS: 0
SORE THROAT: 0
DYSURIA: 0
MYALGIAS: 1
DIZZINESS: 0
ABDOMINAL PAIN: 1
SHORTNESS OF BREATH: 0
COUGH: 0
WEAKNESS: 0
FREQUENCY: 0
DIARRHEA: 0

## 2023-12-05 ENCOUNTER — HOSPITAL ENCOUNTER (OUTPATIENT)
Dept: MAMMOGRAPHY | Facility: CLINIC | Age: 63
Discharge: HOME | End: 2023-12-05
Attending: FAMILY MEDICINE | Admitting: FAMILY MEDICINE
Payer: COMMERCIAL

## 2023-12-05 DIAGNOSIS — Z12.31 VISIT FOR SCREENING MAMMOGRAM: ICD-10-CM

## 2023-12-05 PROCEDURE — 77067 SCR MAMMO BI INCL CAD: CPT

## 2023-12-07 ENCOUNTER — OFFICE VISIT (OUTPATIENT)
Dept: FAMILY MEDICINE | Facility: CLINIC | Age: 63
End: 2023-12-07
Payer: COMMERCIAL

## 2023-12-07 ENCOUNTER — MYC MEDICAL ADVICE (OUTPATIENT)
Dept: FAMILY MEDICINE | Facility: CLINIC | Age: 63
End: 2023-12-07

## 2023-12-07 VITALS
DIASTOLIC BLOOD PRESSURE: 87 MMHG | SYSTOLIC BLOOD PRESSURE: 129 MMHG | RESPIRATION RATE: 17 BRPM | BODY MASS INDEX: 32.95 KG/M2 | HEIGHT: 64 IN | TEMPERATURE: 97.8 F | WEIGHT: 193 LBS | HEART RATE: 69 BPM | OXYGEN SATURATION: 99 %

## 2023-12-07 DIAGNOSIS — F40.240 CLAUSTROPHOBIA: Primary | ICD-10-CM

## 2023-12-07 DIAGNOSIS — R10.11 RUQ ABDOMINAL PAIN: ICD-10-CM

## 2023-12-07 DIAGNOSIS — Z00.00 ROUTINE GENERAL MEDICAL EXAMINATION AT A HEALTH CARE FACILITY: Primary | ICD-10-CM

## 2023-12-07 DIAGNOSIS — Q04.6 COLLOID CYST OF THIRD VENTRICLE (H): ICD-10-CM

## 2023-12-07 DIAGNOSIS — K21.9 GASTROESOPHAGEAL REFLUX DISEASE WITHOUT ESOPHAGITIS: ICD-10-CM

## 2023-12-07 DIAGNOSIS — Z13.6 CARDIOVASCULAR SCREENING; LDL GOAL LESS THAN 130: ICD-10-CM

## 2023-12-07 DIAGNOSIS — I10 ESSENTIAL HYPERTENSION WITH GOAL BLOOD PRESSURE LESS THAN 140/90: ICD-10-CM

## 2023-12-07 DIAGNOSIS — Z13.1 SCREENING FOR DIABETES MELLITUS: ICD-10-CM

## 2023-12-07 DIAGNOSIS — D35.2 PITUITARY ADENOMA (H): ICD-10-CM

## 2023-12-07 LAB
ALBUMIN SERPL BCG-MCNC: 4.6 G/DL (ref 3.5–5.2)
ALP SERPL-CCNC: 56 U/L (ref 40–150)
ALT SERPL W P-5'-P-CCNC: 26 U/L (ref 0–50)
ANION GAP SERPL CALCULATED.3IONS-SCNC: 12 MMOL/L (ref 7–15)
AST SERPL W P-5'-P-CCNC: 20 U/L (ref 0–45)
BASOPHILS # BLD AUTO: 0 10E3/UL (ref 0–0.2)
BASOPHILS NFR BLD AUTO: 1 %
BILIRUB SERPL-MCNC: 0.8 MG/DL
BUN SERPL-MCNC: 18.2 MG/DL (ref 8–23)
CALCIUM SERPL-MCNC: 9.4 MG/DL (ref 8.8–10.2)
CHLORIDE SERPL-SCNC: 103 MMOL/L (ref 98–107)
CHOLEST SERPL-MCNC: 207 MG/DL
CREAT SERPL-MCNC: 0.92 MG/DL (ref 0.51–0.95)
DEPRECATED HCO3 PLAS-SCNC: 25 MMOL/L (ref 22–29)
EGFRCR SERPLBLD CKD-EPI 2021: 70 ML/MIN/1.73M2
EOSINOPHIL # BLD AUTO: 0.2 10E3/UL (ref 0–0.7)
EOSINOPHIL NFR BLD AUTO: 3 %
ERYTHROCYTE [DISTWIDTH] IN BLOOD BY AUTOMATED COUNT: 12.1 % (ref 10–15)
FASTING STATUS PATIENT QL REPORTED: YES
GLUCOSE SERPL-MCNC: 112 MG/DL (ref 70–99)
HBA1C MFR BLD: 5.4 % (ref 0–5.6)
HCT VFR BLD AUTO: 41 % (ref 35–47)
HDLC SERPL-MCNC: 61 MG/DL
HGB BLD-MCNC: 14 G/DL (ref 11.7–15.7)
IMM GRANULOCYTES # BLD: 0 10E3/UL
IMM GRANULOCYTES NFR BLD: 0 %
LDLC SERPL CALC-MCNC: 129 MG/DL
LIPASE SERPL-CCNC: 29 U/L (ref 13–60)
LYMPHOCYTES # BLD AUTO: 1.6 10E3/UL (ref 0.8–5.3)
LYMPHOCYTES NFR BLD AUTO: 27 %
MCH RBC QN AUTO: 31.9 PG (ref 26.5–33)
MCHC RBC AUTO-ENTMCNC: 34.1 G/DL (ref 31.5–36.5)
MCV RBC AUTO: 93 FL (ref 78–100)
MONOCYTES # BLD AUTO: 0.5 10E3/UL (ref 0–1.3)
MONOCYTES NFR BLD AUTO: 8 %
NEUTROPHILS # BLD AUTO: 3.6 10E3/UL (ref 1.6–8.3)
NEUTROPHILS NFR BLD AUTO: 62 %
NONHDLC SERPL-MCNC: 146 MG/DL
PLATELET # BLD AUTO: 277 10E3/UL (ref 150–450)
POTASSIUM SERPL-SCNC: 4.1 MMOL/L (ref 3.4–5.3)
PROT SERPL-MCNC: 7.6 G/DL (ref 6.4–8.3)
RBC # BLD AUTO: 4.39 10E6/UL (ref 3.8–5.2)
SODIUM SERPL-SCNC: 140 MMOL/L (ref 135–145)
TRIGL SERPL-MCNC: 84 MG/DL
WBC # BLD AUTO: 5.9 10E3/UL (ref 4–11)

## 2023-12-07 PROCEDURE — 80061 LIPID PANEL: CPT | Performed by: FAMILY MEDICINE

## 2023-12-07 PROCEDURE — 99396 PREV VISIT EST AGE 40-64: CPT | Performed by: FAMILY MEDICINE

## 2023-12-07 PROCEDURE — 36415 COLL VENOUS BLD VENIPUNCTURE: CPT | Performed by: FAMILY MEDICINE

## 2023-12-07 PROCEDURE — 83690 ASSAY OF LIPASE: CPT | Performed by: FAMILY MEDICINE

## 2023-12-07 PROCEDURE — 80053 COMPREHEN METABOLIC PANEL: CPT | Performed by: FAMILY MEDICINE

## 2023-12-07 PROCEDURE — 83036 HEMOGLOBIN GLYCOSYLATED A1C: CPT | Performed by: FAMILY MEDICINE

## 2023-12-07 PROCEDURE — 99214 OFFICE O/P EST MOD 30 MIN: CPT | Mod: 25 | Performed by: FAMILY MEDICINE

## 2023-12-07 PROCEDURE — 85025 COMPLETE CBC W/AUTO DIFF WBC: CPT | Performed by: FAMILY MEDICINE

## 2023-12-07 RX ORDER — LISINOPRIL 10 MG/1
10 TABLET ORAL DAILY
Qty: 90 TABLET | Refills: 3 | Status: SHIPPED | OUTPATIENT
Start: 2023-12-07

## 2023-12-07 ASSESSMENT — ENCOUNTER SYMPTOMS
MYALGIAS: 1
CONSTIPATION: 0
WEAKNESS: 0
PARESTHESIAS: 0
HEMATOCHEZIA: 0
DIARRHEA: 0
CHILLS: 0
NAUSEA: 0
BREAST MASS: 0
ABDOMINAL PAIN: 1
JOINT SWELLING: 0
NERVOUS/ANXIOUS: 0
PALPITATIONS: 0
HEADACHES: 0
EYE PAIN: 0
FEVER: 0
FREQUENCY: 0
ARTHRALGIAS: 0
SHORTNESS OF BREATH: 0
COUGH: 0
HEARTBURN: 0
DIZZINESS: 0
HEMATURIA: 0
SORE THROAT: 0
DYSURIA: 0

## 2023-12-07 ASSESSMENT — PAIN SCALES - GENERAL: PAINLEVEL: NO PAIN (0)

## 2023-12-07 NOTE — PROGRESS NOTES
SUBJECTIVE:   Asher is a 63 year old, presenting for the following:  Physical        2023     8:11 AM   Additional Questions   Roomed by asher       Healthy Habits:     Getting at least 3 servings of Calcium per day:  Yes    Bi-annual eye exam:  Yes    Dental care twice a year:  Yes    Sleep apnea or symptoms of sleep apnea:  None    Diet:  Carbohydrate counting    Frequency of exercise:  4-5 days/week    Duration of exercise:  30-45 minutes    Taking medications regularly:  Yes    Medication side effects:  Not applicable    Additional concerns today:  Yes      Abdominal pain  ended up being admitted   Seen in [place] in October for abdominal/epigastric pain  Was told it was a cyst    Had RUQ pain   Everyday it is a dull pain  More intense last night  Does not come on after eating  There all day long /every day every since more painful attack a month ago  No nausea/vomiting/fever/black or bloody stools  Better with moving around during the day  Not using nsaids regularly  Already on ppi    Overdue for follow-up on pituitary adenoma and colloid cyst  Needs to schedule appt with neurosurgery  Will get MRI scheduled        Social History     Tobacco Use    Smoking status: Former     Packs/day: 0.00     Years: 0.00     Additional pack years: 0.00     Total pack years: 0.00     Types: Cigarettes     Quit date: 2012     Years since quittin.8    Smokeless tobacco: Never   Substance Use Topics    Alcohol use: Yes     Comment: GLASS OF WINE EVERY NIGHT             2023     9:55 AM   Alcohol Use   Prescreen: >3 drinks/day or >7 drinks/week? No     Reviewed orders with patient.  Reviewed health maintenance and updated orders accordingly - Yes  Lab work is in process    Breast Cancer Screening:    FHS-7:       2021     2:28 PM 10/4/2021     8:34 AM 2022     1:54 PM 2022     8:03 AM 2023     9:57 AM 2023    11:18 AM   Breast CA Risk Assessment (FHS-7)   Did any of your first-degree  relatives have breast or ovarian cancer? Yes Yes Yes Yes Yes Yes   Did any of your relatives have bilateral breast cancer? No No No No No    Did any man in your family have breast cancer? No No No No No    Did any woman in your family have breast and ovarian cancer? Yes No No Yes No    Did any woman in your family have breast cancer before age 50 y? No No No No No    Do you have 2 or more relatives with breast and/or ovarian cancer? No No Yes Yes No    Do you have 2 or more relatives with breast and/or bowel cancer? No No No Yes No        Mammogram Screening: Recommended mammography every 1-2 years with patient discussion and risk factor consideration  Pertinent mammograms are reviewed under the imaging tab.    History of abnormal Pap smear: Status post hysterectomy. Pap still indicated. no     Reviewed and updated as needed this visit by clinical staff   Tobacco  Allergies  Meds              Reviewed and updated as needed this visit by Provider                     Review of Systems   Constitutional:  Negative for chills and fever.   HENT:  Negative for congestion, ear pain, hearing loss and sore throat.    Eyes:  Negative for pain and visual disturbance.   Respiratory:  Negative for cough and shortness of breath.    Cardiovascular:  Negative for chest pain, palpitations and peripheral edema.   Gastrointestinal:  Positive for abdominal pain. Negative for constipation, diarrhea, heartburn, hematochezia and nausea.   Breasts:  Negative for tenderness, breast mass and discharge.   Genitourinary:  Negative for dysuria, frequency, genital sores, hematuria, pelvic pain, urgency, vaginal bleeding and vaginal discharge.   Musculoskeletal:  Positive for myalgias. Negative for arthralgias and joint swelling.   Skin:  Negative for rash.   Neurological:  Negative for dizziness, weakness, headaches and paresthesias.   Psychiatric/Behavioral:  Negative for mood changes. The patient is not nervous/anxious.      CONSTITUTIONAL:  "NEGATIVE for fever, chills, change in weight  INTEGUMENTARY/SKIN: NEGATIVE for worrisome rashes, moles or lesions  EYES: NEGATIVE for vision changes or irritation  ENT: NEGATIVE for ear, mouth and throat problems  RESP: NEGATIVE for significant cough or SOB  BREAST: NEGATIVE for masses, tenderness or discharge  CV: NEGATIVE for chest pain, palpitations or peripheral edema  GI: NEGATIVE for nausea, abdominal pain, heartburn, or change in bowel habits  : NEGATIVE for unusual urinary or vaginal symptoms. No vaginal bleeding.  MUSCULOSKELETAL: NEGATIVE for significant arthralgias or myalgia  NEURO: NEGATIVE for weakness, dizziness or paresthesias  PSYCHIATRIC: NEGATIVE for changes in mood or affect      OBJECTIVE:   BP (!) 156/86   Pulse 66   Temp 97.8  F (36.6  C) (Oral)   Resp 17   Ht 1.613 m (5' 3.5\")   Wt 87.5 kg (193 lb)   SpO2 99%   BMI 33.65 kg/m    Physical Exam  GENERAL: healthy, alert and no distress  EYES: Eyes grossly normal to inspection, PERRL and conjunctivae and sclerae normal  HENT: ear canals and TM's normal, nose and mouth without ulcers or lesions  NECK: no adenopathy, no asymmetry, masses, or scars and thyroid normal to palpation  RESP: lungs clear to auscultation - no rales, rhonchi or wheezes  BREAST: normal without masses, tenderness or nipple discharge and no palpable axillary masses or adenopathy  CV: regular rate and rhythm, normal S1 S2, no S3 or S4, no murmur, click or rub, no peripheral edema and peripheral pulses strong  ABDOMEN: soft, nontender, no hepatosplenomegaly, no masses and bowel sounds normal  MS: no gross musculoskeletal defects noted, no edema  SKIN: no suspicious lesions or rashes  NEURO: Normal strength and tone, mentation intact and speech normal  PSYCH: mentation appears normal, affect normal/bright    Diagnostic Test Results:  Labs reviewed in Epic    ASSESSMENT/PLAN:   (Z00.00) Routine general medical examination at a health care facility  (primary encounter " diagnosis)  Comment:   Plan:     (R10.11) RUQ abdominal pain  Comment: needs further labwork and imaging, suspect gallbladder issue  Plan: US Abdomen Complete, **CBC with platelets         differential FUTURE 2mo, Lipase, Helicobacter         pylori Antigen Stool            (D35.2) Pituitary adenoma (H)  Comment: overdue for fu  Plan: MR Brain w/o Contrast            (Q04.6) Colloid cyst of third ventricle (H)  Comment: same  Plan: MR Brain w/o Contrast            (I10) Essential hypertension with goal blood pressure less than 140/90  Comment: at goal on meds  Plan: lisinopril (ZESTRIL) 10 MG tablet,         **Comprehensive metabolic panel FUTURE 2mo,         CANCELED: **CBC with platelets FUTURE 2mo            (K21.9) Gastroesophageal reflux disease without esophagitis  Comment: refill as is working well  Plan: omeprazole (PRILOSEC) 20 MG DR capsule            (Z13.6) CARDIOVASCULAR SCREENING; LDL GOAL LESS THAN 130  Comment:   Plan: Lipid panel reflex to direct LDL Fasting            (Z13.1) Screening for diabetes mellitus  Comment:   Plan: **Hemoglobin A1c FUTURE 3mo            Patient has been advised of split billing requirements and indicates understanding: Yes      COUNSELING:  Reviewed preventive health counseling, as reflected in patient instructions       Regular exercise       Healthy diet/nutrition       Vision screening       Colorectal Cancer Screening        She reports that she quit smoking about 11 years ago. Her smoking use included cigarettes. She has never used smokeless tobacco.          Greer Sanchez MD  [facility]

## 2023-12-08 PROCEDURE — 87338 HPYLORI STOOL AG IA: CPT | Performed by: FAMILY MEDICINE

## 2023-12-08 RX ORDER — DIAZEPAM 2 MG
TABLET ORAL
Qty: 5 TABLET | Refills: 0 | Status: SHIPPED | OUTPATIENT
Start: 2023-12-08

## 2023-12-08 NOTE — TELEPHONE ENCOUNTER
Routing to provider to review and advise, see [employer] message and patient request below.  Rx last given 8/16/2021:    diazepam (VALIUM) 2 MG tablet 5 tablet 0 8/16/2021  --   Sig: Take 1-2 tablets 1 hour before MRI. Do not drive while under the influence.

## 2023-12-11 LAB — H PYLORI AG STL QL IA: NEGATIVE

## 2023-12-21 ENCOUNTER — MYC MEDICAL ADVICE (OUTPATIENT)
Dept: FAMILY MEDICINE | Facility: CLINIC | Age: 63
End: 2023-12-21
Payer: COMMERCIAL

## 2023-12-21 DIAGNOSIS — Q04.6 COLLOID CYST OF THIRD VENTRICLE (H): ICD-10-CM

## 2023-12-21 DIAGNOSIS — D35.2 PITUITARY ADENOMA (H): Primary | ICD-10-CM

## 2023-12-22 ENCOUNTER — HOSPITAL ENCOUNTER (OUTPATIENT)
Dept: MRI IMAGING | Facility: CLINIC | Age: 63
Discharge: HOME | End: 2023-12-22
Attending: FAMILY MEDICINE | Admitting: FAMILY MEDICINE
Payer: COMMERCIAL

## 2023-12-22 DIAGNOSIS — D35.2 PITUITARY ADENOMA (H): ICD-10-CM

## 2023-12-22 DIAGNOSIS — Q04.6 COLLOID CYST OF THIRD VENTRICLE (H): ICD-10-CM

## 2023-12-22 PROCEDURE — 255N000002 HC RX 255 OP 636: Performed by: FAMILY MEDICINE

## 2023-12-22 PROCEDURE — A9585 GADOBUTROL INJECTION: HCPCS | Performed by: FAMILY MEDICINE

## 2023-12-22 PROCEDURE — 258N000003 HC RX IP 258 OP 636: Performed by: FAMILY MEDICINE

## 2023-12-22 PROCEDURE — 70553 MRI BRAIN STEM W/O & W/DYE: CPT

## 2023-12-22 RX ORDER — GADOBUTROL 604.72 MG/ML
8.5 INJECTION INTRAVENOUS ONCE
Status: COMPLETED | OUTPATIENT
Start: 2023-12-22 | End: 2023-12-22

## 2023-12-22 RX ADMIN — SODIUM CHLORIDE 50 ML: 9 INJECTION, SOLUTION INTRAVENOUS at 14:02

## 2023-12-22 RX ADMIN — GADOBUTROL 8.5 ML: 604.72 INJECTION INTRAVENOUS at 13:50

## 2023-12-27 NOTE — CONFIDENTIAL NOTE
NEUROSURGERY- NEW PREVISIT PLANNING       Record Status/Location     Referring Provider Referral Greer Sanchez MD    Diagnosis Referral D35.2 (ICD-10-CM) - Pituitary adenoma (H)   Q04.6 (ICD-10-CM) - Colloid cyst of third ventricle (H)      MRI (HEAD, NECK, SPINE) Pacs Brain 12/22/23 [facility]    CT Na    X-ray Na    INJECTION Na    PHYSICAL THERAPY Na    SURGERY Na

## 2023-12-28 ENCOUNTER — ANCILLARY PROCEDURE (OUTPATIENT)
Dept: ULTRASOUND IMAGING | Facility: CLINIC | Age: 63
End: 2023-12-28
Attending: FAMILY MEDICINE
Payer: COMMERCIAL

## 2023-12-28 DIAGNOSIS — R10.11 RUQ ABDOMINAL PAIN: ICD-10-CM

## 2023-12-28 PROCEDURE — 76700 US EXAM ABDOM COMPLETE: CPT | Mod: TC | Performed by: RADIOLOGY

## 2024-01-02 NOTE — CONFIDENTIAL NOTE
Action P [phone] [facility] 1/2    Action Taken Requesting records, need faxed request     Action F [phone] ^   Action Taken Requested

## 2024-01-04 ENCOUNTER — OFFICE VISIT (OUTPATIENT)
Dept: NEUROSURGERY | Facility: CLINIC | Age: 64
End: 2024-01-04
Attending: FAMILY MEDICINE
Payer: COMMERCIAL

## 2024-01-04 ENCOUNTER — PRE VISIT (OUTPATIENT)
Dept: NEUROSURGERY | Facility: CLINIC | Age: 64
End: 2024-01-04

## 2024-01-04 VITALS
HEIGHT: 64 IN | TEMPERATURE: 98.3 F | DIASTOLIC BLOOD PRESSURE: 90 MMHG | SYSTOLIC BLOOD PRESSURE: 158 MMHG | OXYGEN SATURATION: 99 % | WEIGHT: 193 LBS | BODY MASS INDEX: 32.95 KG/M2 | HEART RATE: 71 BPM

## 2024-01-04 DIAGNOSIS — Q04.6 COLLOID CYST OF THIRD VENTRICLE (H): ICD-10-CM

## 2024-01-04 DIAGNOSIS — D35.2 PITUITARY ADENOMA (H): Primary | ICD-10-CM

## 2024-01-04 LAB
ANION GAP SERPL CALCULATED.3IONS-SCNC: 12 MMOL/L (ref 7–15)
BUN SERPL-MCNC: 17.5 MG/DL (ref 8–23)
CALCIUM SERPL-MCNC: 9.3 MG/DL (ref 8.8–10.2)
CHLORIDE SERPL-SCNC: 106 MMOL/L (ref 98–107)
CORTIS SERPL-MCNC: 5.9 UG/DL
CREAT SERPL-MCNC: 0.84 MG/DL (ref 0.51–0.95)
DEPRECATED HCO3 PLAS-SCNC: 23 MMOL/L (ref 22–29)
EGFRCR SERPLBLD CKD-EPI 2021: 78 ML/MIN/1.73M2
FSH SERPL IRP2-ACNC: 50.4 MIU/ML
GLUCOSE SERPL-MCNC: 101 MG/DL (ref 70–99)
LH SERPL-ACNC: 27.2 MIU/ML
POTASSIUM SERPL-SCNC: 4.2 MMOL/L (ref 3.4–5.3)
PROLACTIN SERPL 3RD IS-MCNC: 8 NG/ML (ref 5–23)
SODIUM SERPL-SCNC: 141 MMOL/L (ref 135–145)
T4 FREE SERPL-MCNC: 1.01 NG/DL (ref 0.9–1.7)
TSH SERPL DL<=0.005 MIU/L-ACNC: 2.22 UIU/ML (ref 0.3–4.2)

## 2024-01-04 PROCEDURE — 84305 ASSAY OF SOMATOMEDIN: CPT | Performed by: NEUROLOGICAL SURGERY

## 2024-01-04 PROCEDURE — 82397 CHEMILUMINESCENT ASSAY: CPT | Mod: 90 | Performed by: NEUROLOGICAL SURGERY

## 2024-01-04 PROCEDURE — 84443 ASSAY THYROID STIM HORMONE: CPT | Performed by: NEUROLOGICAL SURGERY

## 2024-01-04 PROCEDURE — 83003 ASSAY GROWTH HORMONE (HGH): CPT | Performed by: NEUROLOGICAL SURGERY

## 2024-01-04 PROCEDURE — 84403 ASSAY OF TOTAL TESTOSTERONE: CPT | Performed by: NEUROLOGICAL SURGERY

## 2024-01-04 PROCEDURE — 84146 ASSAY OF PROLACTIN: CPT | Performed by: NEUROLOGICAL SURGERY

## 2024-01-04 PROCEDURE — 84439 ASSAY OF FREE THYROXINE: CPT | Performed by: NEUROLOGICAL SURGERY

## 2024-01-04 PROCEDURE — 82533 TOTAL CORTISOL: CPT | Performed by: NEUROLOGICAL SURGERY

## 2024-01-04 PROCEDURE — 36415 COLL VENOUS BLD VENIPUNCTURE: CPT | Performed by: NEUROLOGICAL SURGERY

## 2024-01-04 PROCEDURE — 80048 BASIC METABOLIC PNL TOTAL CA: CPT | Performed by: NEUROLOGICAL SURGERY

## 2024-01-04 PROCEDURE — 82024 ASSAY OF ACTH: CPT | Performed by: NEUROLOGICAL SURGERY

## 2024-01-04 PROCEDURE — 99000 SPECIMEN HANDLING OFFICE-LAB: CPT | Performed by: NEUROLOGICAL SURGERY

## 2024-01-04 PROCEDURE — 83001 ASSAY OF GONADOTROPIN (FSH): CPT | Performed by: NEUROLOGICAL SURGERY

## 2024-01-04 PROCEDURE — 99243 OFF/OP CNSLTJ NEW/EST LOW 30: CPT | Performed by: NEUROLOGICAL SURGERY

## 2024-01-04 PROCEDURE — 83002 ASSAY OF GONADOTROPIN (LH): CPT | Performed by: NEUROLOGICAL SURGERY

## 2024-01-04 ASSESSMENT — PAIN SCALES - GENERAL: PAINLEVEL: NO PAIN (0)

## 2024-01-04 NOTE — PATIENT INSTRUCTIONS
Orders for Pituitary MRI w & without contrast to be completed in 2 years. We will call you with the results. If you don't hear from us 7 days after the scan, please call us.  [place] ([place], [place], [place]): [phone]      [place]: [phone]    [place] ([facility]): [phone]     Order for a blood draw called a Pituitary Panel. This can be done today in clinic.    [facility]   [facility] - [address] [address]  Telephone:  [phone]       Fax:  [phone]

## 2024-01-04 NOTE — LETTER
1/4/2024         RE: Josephine Heard  [address] [facility] [address]        Dear Colleague,    Thank you for referring your patient, Josephine Heard, to the [facility]. Please see a copy of my visit note below.    Sent reminder message to order MRI in Sept 2025. Sent second reminder to ensure patient is scheduled for MRI Dec 2025. Intended completion date is Jan 2026.    Gladis Pickering RN on 1/4/2024 at 10:28 AM      I was asked by Dr. Sanchez to see this patient in consultation    63 year old female with colloid cyst and pituitary adenoma.  Colloid cyst was noted on work-up for vertigo in 2010.  Had a follow up MRI in 2021, on which cyst was noted to be stable, and pituitary adenoma was noted.  She saw [facility] at the time, who recommended Optho and Endo eval, but she was not able to complete these.  Recently underwent repeat MRI for surveillance.  Notes continued episodic vertigo, worst when driving.  No headaches, vision loss, or endocrine symptoms.  MR Brain, personally reviewed, shows stable 6 mm colloid cyst without hydrocephalus, and stable 9 mm pituitary adenoma without chiasmatic compression.       Past Medical History:   Diagnosis Date     Endometriosis      Fatty liver     mild elevated liver enzymes     GERD (gastroesophageal reflux disease)      HTN (hypertension), benign      IBS (irritable bowel syndrome)      LA (lymphadenopathy) - ? 05/16/2012     LA (lymphadenopathy) - ?      Seasonal allergies      Vertigo      Past Surgical History:   Procedure Laterality Date     APPENDECTOMY  07/29/2019     BIOPSY       BIOPSY BREAST       COLONOSCOPY  02/19/2013    Procedure: COLONOSCOPY;  Colonoscopy, screening;  Surgeon: Gaetano Saravia MD;  Location: [facility] OR     COLONOSCOPY N/A 4/10/2023    Procedure: COLONOSCOPY, FLEXIBLE, WITH LESION REMOVAL USING SNARE;  Surgeon: Rafael Garcia MD;  Location: [facility]     HC LAPAROSCOPIC MYOMECTOMY, 1 -  4 INTRAMURAL MYOMAS =<250 GM       HC REMOVE TONSILS/ADENOIDS,<13 Y/O       HYSTERECTOMY, PAP NO LONGER INDICATED       HYSTERECTOMY, NADER      secondary to menorrhagia     TUBAL/ECTOPIC PREGNANCY       Social History     Socioeconomic History     Marital status:      Spouse name: Not on file     Number of children: Not on file     Years of education: Not on file     Highest education level: Not on file   Occupational History     Not on file   Tobacco Use     Smoking status: Former     Packs/day: 0.00     Years: 0.00     Additional pack years: 0.00     Total pack years: 0.00     Types: Cigarettes     Quit date: 2012     Years since quittin.9     Smokeless tobacco: Never   Vaping Use     Vaping Use: Never used   Substance and Sexual Activity     Alcohol use: Yes     Comment: GLASS OF WINE EVERY NIGHT     Drug use: No     Sexual activity: Not Currently     Partners: Male     Birth control/protection: Post-menopausal   Other Topics Concern     Parent/sibling w/ CABG, MI or angioplasty before 65F 55M? No      Service No     Blood Transfusions No     Caffeine Concern No     Occupational Exposure No     Hobby Hazards No     Sleep Concern No     Stress Concern No     Weight Concern Yes     Special Diet No     Back Care No     Exercise No     Bike Helmet No     Seat Belt No     Self-Exams No   Social History Narrative     Not on file     Social Determinants of Health     Financial Resource Strain: Low Risk  (2023)    Financial Resource Strain      Within the past 12 months, have you or your family members you live with been unable to get utilities (heat, electricity) when it was really needed?: No   Food Insecurity: Low Risk  (2023)    Food Insecurity      Within the past 12 months, did you worry that your food would run out before you got money to buy more?: No      Within the past 12 months, did the food you bought just not last and you didn t have money to get more?: No  "  Transportation Needs: Low Risk  (12/2/2023)    Transportation Needs      Within the past 12 months, has lack of transportation kept you from medical appointments, getting your medicines, non-medical meetings or appointments, work, or from getting things that you need?: No   Physical Activity: Not on file   Stress: Not on file   Social Connections: Not on file   Interpersonal Safety: Low Risk  (12/7/2023)    Interpersonal Safety      Do you feel physically and emotionally safe where you currently live?: Yes      Within the past 12 months, have you been hit, slapped, kicked or otherwise physically hurt by someone?: No      Within the past 12 months, have you been humiliated or emotionally abused in other ways by your partner or ex-partner?: No   Housing Stability: Low Risk  (12/2/2023)    Housing Stability      Do you have housing? : Yes      Are you worried about losing your housing?: No     Family History   Problem Relation Age of Onset     Eye Disorder Mother         GLAUCOMA     Gastrointestinal Disease Mother         TWISTED BOWEL     Gynecology Mother         UTERINE CA /BREAST     Neurologic Disorder Mother         Migraines     Breast Cancer Mother      Glaucoma Mother      Dementia Mother      Diabetes Father      Blood Disease Sister         HEPATITIS C     Unknown/Adopted Sister      Breast Cancer Sister      Eye Disorder Maternal Grandmother         GLAUCOMA     Cerebrovascular Disease Maternal Grandmother      Glaucoma Maternal Grandmother      Diabetes Maternal Grandfather      Cerebrovascular Disease Maternal Grandfather         ROS: 10 point ROS neg other than the symptoms noted above in the HPI.    Physical Exam  BP (!) 158/90   Pulse 71   Temp 98.3  F (36.8  C)   Ht 1.613 m (5' 3.5\")   Wt 87.5 kg (193 lb)   SpO2 99%   BMI 33.65 kg/m    HEENT:  Normocephalic, atraumatic.  PERRLA.  EOM s intact.  Visual fields full to gross exam  Neck:  Supple, non-tender, without lymphadenopathy.  Heart:  No " peripheral edema  Lungs:  No SOB  Abdomen:  Non-distended.   Skin:  Warm and dry.  Extremities:  No edema, cyanosis or clubbing.  Psychiatric:  No apparent distress  Musculoskeletal:  Normal bulk and tone    NEUROLOGICAL EXAMINATION:     Mental status:  Alert and Oriented x 3, speech is fluent.  Cranial nerves:  II-XII intact.   Motor:    Shoulder Abduction:  Right:  5/5   Left:  5/5  Biceps:                      Right:  5/5   Left:  5/5  Triceps:                     Right:  5/5   Left:  5/5  Wrist Extensors:       Right:  5/5   Left:  5/5  Wrist Flexors:           Right:  5/5   Left:  5/5  interosseus :            Right:  5/5   Left:  5/5  Hip Flexion:                Right: 5/5  Left:  5/5  Quadriceps:             Right:  5/5  Left:  5/5  Hamstrings:             Right:  5/5  Left:  5/5  Gastroc Soleus:        Right:  5/5  Left:  5/5  Tib/Ant:                      Right:  5/5  Left:  5/5  EHL:                     Right:  5/5  Left:  5/5  Sensation:  Intact  Reflexes:  Negative Babinski.  Negative Clonus.  Negative Ramirez's.  Coordination:  Smooth finger to nose testing.   Negative pronator drift.  Smooth tandem walking.    A/P:  63 year old female with colloid cyst and pituitary adenoma    I had a discussion with the patient, reviewing the history, symptoms, and imaging  Underwent past extensive work-up for vertigo  Will obtain Endocrine panel  Repeat MRI in 2 years          Again, thank you for allowing me to participate in the care of your patient.        Sincerely,        Roman Muniz MD

## 2024-01-04 NOTE — PROGRESS NOTES
Sent reminder message to order MRI in Sept 2025. Sent second reminder to ensure patient is scheduled for MRI Dec 2025. Intended completion date is Jan 2026.    Gladis Pickering RN on 1/4/2024 at 10:28 AM

## 2024-01-04 NOTE — PROGRESS NOTES
I was asked by Dr. Sanchez to see this patient in consultation    63 year old female with colloid cyst and pituitary adenoma.  Colloid cyst was noted on work-up for vertigo in 2010.  Had a follow up MRI in 2021, on which cyst was noted to be stable, and pituitary adenoma was noted.  She saw [facility] at the time, who recommended Optho and Endo eval, but she was not able to complete these.  Recently underwent repeat MRI for surveillance.  Notes continued episodic vertigo, worst when driving.  No headaches, vision loss, or endocrine symptoms.  MR Brain, personally reviewed, shows stable 6 mm colloid cyst without hydrocephalus, and stable 9 mm pituitary adenoma without chiasmatic compression.       Past Medical History:   Diagnosis Date    Endometriosis     Fatty liver     mild elevated liver enzymes    GERD (gastroesophageal reflux disease)     HTN (hypertension), benign     IBS (irritable bowel syndrome)     LA (lymphadenopathy) - ? 05/16/2012    LA (lymphadenopathy) - ?     Seasonal allergies     Vertigo      Past Surgical History:   Procedure Laterality Date    APPENDECTOMY  07/29/2019    BIOPSY      BIOPSY BREAST      COLONOSCOPY  02/19/2013    Procedure: COLONOSCOPY;  Colonoscopy, screening;  Surgeon: Gaetano Saravia MD;  Location: [facility]    COLONOSCOPY N/A 4/10/2023    Procedure: COLONOSCOPY, FLEXIBLE, WITH LESION REMOVAL USING SNARE;  Surgeon: Rafael Garcia MD;  Location: [facility]    HC LAPAROSCOPIC MYOMECTOMY, 1 - 4 INTRAMURAL MYOMAS =<250 GM  1991    HC REMOVE TONSILS/ADENOIDS,<11 Y/O      HYSTERECTOMY, PAP NO LONGER INDICATED      HYSTERECTOMY, NADER  2007    secondary to menorrhagia    TUBAL/ECTOPIC PREGNANCY  1994/1995     Social History     Socioeconomic History    Marital status:      Spouse name: Not on file    Number of children: Not on file    Years of education: Not on file    Highest education level: Not on file   Occupational History    Not on file   Tobacco Use     Smoking status: Former     Packs/day: 0.00     Years: 0.00     Additional pack years: 0.00     Total pack years: 0.00     Types: Cigarettes     Quit date: 2012     Years since quittin.9    Smokeless tobacco: Never   Vaping Use    Vaping Use: Never used   Substance and Sexual Activity    Alcohol use: Yes     Comment: GLASS OF WINE EVERY NIGHT    Drug use: No    Sexual activity: Not Currently     Partners: Male     Birth control/protection: Post-menopausal   Other Topics Concern    Parent/sibling w/ CABG, MI or angioplasty before 65F 55M? No     Service No    Blood Transfusions No    Caffeine Concern No    Occupational Exposure No    Hobby Hazards No    Sleep Concern No    Stress Concern No    Weight Concern Yes    Special Diet No    Back Care No    Exercise No    Bike Helmet No    Seat Belt No    Self-Exams No   Social History Narrative    Not on file     Social Determinants of Health     Financial Resource Strain: Low Risk  (2023)    Financial Resource Strain     Within the past 12 months, have you or your family members you live with been unable to get utilities (heat, electricity) when it was really needed?: No   Food Insecurity: Low Risk  (2023)    Food Insecurity     Within the past 12 months, did you worry that your food would run out before you got money to buy more?: No     Within the past 12 months, did the food you bought just not last and you didn t have money to get more?: No   Transportation Needs: Low Risk  (2023)    Transportation Needs     Within the past 12 months, has lack of transportation kept you from medical appointments, getting your medicines, non-medical meetings or appointments, work, or from getting things that you need?: No   Physical Activity: Not on file   Stress: Not on file   Social Connections: Not on file   Interpersonal Safety: Low Risk  (2023)    Interpersonal Safety     Do you feel physically and emotionally safe where you currently live?: Yes  "    Within the past 12 months, have you been hit, slapped, kicked or otherwise physically hurt by someone?: No     Within the past 12 months, have you been humiliated or emotionally abused in other ways by your partner or ex-partner?: No   Housing Stability: Low Risk  (12/2/2023)    Housing Stability     Do you have housing? : Yes     Are you worried about losing your housing?: No     Family History   Problem Relation Age of Onset    Eye Disorder Mother         GLAUCOMA    Gastrointestinal Disease Mother         TWISTED BOWEL    Gynecology Mother         UTERINE CA /BREAST    Neurologic Disorder Mother         Migraines    Breast Cancer Mother     Glaucoma Mother     Dementia Mother     Diabetes Father     Blood Disease Sister         HEPATITIS C    Unknown/Adopted Sister     Breast Cancer Sister     Eye Disorder Maternal Grandmother         GLAUCOMA    Cerebrovascular Disease Maternal Grandmother     Glaucoma Maternal Grandmother     Diabetes Maternal Grandfather     Cerebrovascular Disease Maternal Grandfather         ROS: 10 point ROS neg other than the symptoms noted above in the HPI.    Physical Exam  BP (!) 158/90   Pulse 71   Temp 98.3  F (36.8  C)   Ht 1.613 m (5' 3.5\")   Wt 87.5 kg (193 lb)   SpO2 99%   BMI 33.65 kg/m    HEENT:  Normocephalic, atraumatic.  PERRLA.  EOM s intact.  Visual fields full to gross exam  Neck:  Supple, non-tender, without lymphadenopathy.  Heart:  No peripheral edema  Lungs:  No SOB  Abdomen:  Non-distended.   Skin:  Warm and dry.  Extremities:  No edema, cyanosis or clubbing.  Psychiatric:  No apparent distress  Musculoskeletal:  Normal bulk and tone    NEUROLOGICAL EXAMINATION:     Mental status:  Alert and Oriented x 3, speech is fluent.  Cranial nerves:  II-XII intact.   Motor:    Shoulder Abduction:  Right:  5/5   Left:  5/5  Biceps:                      Right:  5/5   Left:  5/5  Triceps:                     Right:  5/5   Left:  5/5  Wrist Extensors:       Right:  5/5   " Left:  5/5  Wrist Flexors:           Right:  5/5   Left:  5/5  interosseus :            Right:  5/5   Left:  5/5  Hip Flexion:                Right: 5/5  Left:  5/5  Quadriceps:             Right:  5/5  Left:  5/5  Hamstrings:             Right:  5/5  Left:  5/5  Gastroc Soleus:        Right:  5/5  Left:  5/5  Tib/Ant:                      Right:  5/5  Left:  5/5  EHL:                     Right:  5/5  Left:  5/5  Sensation:  Intact  Reflexes:  Negative Babinski.  Negative Clonus.  Negative Ramirez's.  Coordination:  Smooth finger to nose testing.   Negative pronator drift.  Smooth tandem walking.    A/P:  63 year old female with colloid cyst and pituitary adenoma    I had a discussion with the patient, reviewing the history, symptoms, and imaging  Underwent past extensive work-up for vertigo  Will obtain Endocrine panel  Repeat MRI in 2 years

## 2024-01-05 ENCOUNTER — DOCUMENTATION ONLY (OUTPATIENT)
Dept: NEUROSURGERY | Facility: CLINIC | Age: 64
End: 2024-01-05
Payer: COMMERCIAL

## 2024-01-05 ENCOUNTER — MYC MEDICAL ADVICE (OUTPATIENT)
Dept: NEUROSURGERY | Facility: CLINIC | Age: 64
End: 2024-01-05

## 2024-01-05 LAB
ACTH PLAS-MCNC: 10 PG/ML
GH SERPL-MCNC: 0.2 UG/L
IGF-I BLD-MCNC: 96 NG/ML (ref 38–244)
TESTOST SERPL-MCNC: 12 NG/DL (ref 8–60)

## 2024-01-05 NOTE — PROGRESS NOTES
Per Dr. Muniz, please let the patient know her endocrine panel looks normal.     Updated patient via [employer]

## 2024-01-06 DIAGNOSIS — K21.9 GASTROESOPHAGEAL REFLUX DISEASE WITHOUT ESOPHAGITIS: ICD-10-CM

## 2024-01-08 DIAGNOSIS — I10 ESSENTIAL HYPERTENSION WITH GOAL BLOOD PRESSURE LESS THAN 140/90: ICD-10-CM

## 2024-01-08 RX ORDER — LISINOPRIL 10 MG/1
10 TABLET ORAL DAILY
Qty: 90 TABLET | Refills: 3 | OUTPATIENT
Start: 2024-01-08

## 2024-01-12 LAB — A-PGH SER-MCNC: 1.2 NG/ML

## 2024-02-06 NOTE — TELEPHONE ENCOUNTER
Patient Quality Outreach    Patient is due for the following:   Hypertension -  BP check    Next Steps:   Schedule a nurse only visit for bp    Type of outreach:    Sent "[employer] message.      Questions for provider review:    None           Tatiana Rivas MA

## 2024-03-11 ENCOUNTER — TELEPHONE (OUTPATIENT)
Dept: FAMILY MEDICINE | Facility: CLINIC | Age: 64
End: 2024-03-11

## 2024-03-11 NOTE — TELEPHONE ENCOUNTER
Prior Authorization Retail Medication Request    Medication/Dose: Omeprazole 20 mg capsules  Diagnosis and ICD code (if different than what is on RX):  Gastroesophageal reflux disease without esophagitis [K21.9]   New/renewal/insurance change PA/secondary ins. PA:  Previously Tried and Failed:    Rationale:      Insurance   Primary: [employer] [place]  Insurance ID:  [id number]     Secondary (if applicable):  Insurance ID:      Pharmacy Information (if different than what is on RX)  Name:  [facility]  Phone:  [phone]  Fax:  [phone]

## 2024-03-22 NOTE — TELEPHONE ENCOUNTER
PA Initiation    Medication: OMEPRAZOLE 20 MG PO CPDR  Insurance Company: [employer] Non-Specialty PA's - Phone [phone] Fax [phone]  Pharmacy Filling the Rx: [id number] [place] [address] [address]  Filling Pharmacy Phone: [phone]  Filling Pharmacy Fax:    Start Date: 3/22/2024

## 2024-03-26 NOTE — TELEPHONE ENCOUNTER
Prior Authorization Approval    Medication: OMEPRAZOLE 20 MG PO CPDR  Authorization Effective Date: 2/21/2024  Authorization Expiration Date: 3/22/2025  Approved Dose/Quantity: 180/90  Reference #: [id number]   Insurance Company: [employer] Non-Specialty PA's - Phone [phone] Fax [phone]  Expected CoPay: $    CoPay Card Available:      Financial Assistance Needed:   Which Pharmacy is filling the prescription: [id number] [address] AT [facility]  Pharmacy Notified: Yes  Patient Notified: Yes

## 2024-05-14 ENCOUNTER — PATIENT OUTREACH (OUTPATIENT)
Dept: FAMILY MEDICINE | Facility: CLINIC | Age: 64
End: 2024-05-14
Payer: COMMERCIAL

## 2024-05-14 NOTE — TELEPHONE ENCOUNTER
Patient Quality Outreach    Patient is due for the following:   Hypertension -  BP check    Next Steps:   Schedule a nurse only visit for bp check    Type of outreach:    Sent [employer] message.      Questions for provider review:    None           Tatiana Rivas MA

## 2024-05-28 ENCOUNTER — OFFICE VISIT (OUTPATIENT)
Dept: FAMILY MEDICINE | Facility: CLINIC | Age: 64
End: 2024-05-28
Payer: COMMERCIAL

## 2024-05-28 ENCOUNTER — ANCILLARY PROCEDURE (OUTPATIENT)
Dept: GENERAL RADIOLOGY | Facility: CLINIC | Age: 64
End: 2024-05-28
Attending: FAMILY MEDICINE
Payer: COMMERCIAL

## 2024-05-28 VITALS
RESPIRATION RATE: 18 BRPM | DIASTOLIC BLOOD PRESSURE: 86 MMHG | HEIGHT: 64 IN | WEIGHT: 199 LBS | SYSTOLIC BLOOD PRESSURE: 132 MMHG | BODY MASS INDEX: 33.97 KG/M2 | HEART RATE: 72 BPM | TEMPERATURE: 96.8 F | OXYGEN SATURATION: 98 %

## 2024-05-28 DIAGNOSIS — M62.838 TRAPEZIUS MUSCLE SPASM: ICD-10-CM

## 2024-05-28 DIAGNOSIS — M54.2 NECK PAIN: Primary | ICD-10-CM

## 2024-05-28 PROCEDURE — 72040 X-RAY EXAM NECK SPINE 2-3 VW: CPT | Mod: TC | Performed by: RADIOLOGY

## 2024-05-28 PROCEDURE — 99213 OFFICE O/P EST LOW 20 MIN: CPT | Performed by: FAMILY MEDICINE

## 2024-05-28 RX ORDER — MELOXICAM 7.5 MG/1
7.5 TABLET ORAL DAILY
Qty: 30 TABLET | Refills: 0 | Status: SHIPPED | OUTPATIENT
Start: 2024-05-28 | End: 2024-06-24

## 2024-05-28 RX ORDER — CYCLOBENZAPRINE HCL 5 MG
5 TABLET ORAL 3 TIMES DAILY PRN
Qty: 30 TABLET | Refills: 0 | Status: SHIPPED | OUTPATIENT
Start: 2024-05-28

## 2024-05-28 ASSESSMENT — PAIN SCALES - GENERAL: PAINLEVEL: MILD PAIN (3)

## 2024-05-28 NOTE — PROGRESS NOTES
"  Assessment & Plan   Josephine Heard is a 63 year old female who presents today for evaluation of posterior neck pain started 3 months ago after snowblowing.  Pain is located mainly on the left trapezius muscle.  She has some occasional tingling sensation in the left upper extremity, but denies weakness.  She has a history of rotator cuff of the left shoulder treated many years ago with conservative management.  Exam is reassuring,She does not have any red flag symptoms.  Discussed with the patient concern.  Symptoms are likely related to trapezius muscle spasm.  Will treat conservatively with Conservatory including Mobic, topical Voltaren, and Flexeril.  Check x-ray cervical spine.  Follow-up in 3 months if no improvement.  Neck pain    - cyclobenzaprine (FLEXERIL) 5 MG tablet; Take 1 tablet (5 mg) by mouth 3 times daily as needed for muscle spasms  - meloxicam (MOBIC) 7.5 MG tablet; Take 1 tablet (7.5 mg) by mouth daily  - diclofenac (VOLTAREN) 1 % topical gel; Apply 2 g topically 4 times daily    Trapezius muscle spasm    - XR Cervical Spine 2/3 Views; Future  - cyclobenzaprine (FLEXERIL) 5 MG tablet; Take 1 tablet (5 mg) by mouth 3 times daily as needed for muscle spasms  - meloxicam (MOBIC) 7.5 MG tablet; Take 1 tablet (7.5 mg) by mouth daily  - diclofenac (VOLTAREN) 1 % topical gel; Apply 2 g topically 4 times daily          BMI  Estimated body mass index is 34.7 kg/m  as calculated from the following:    Height as of this encounter: 1.613 m (5' 3.5\").    Weight as of this encounter: 90.3 kg (199 lb).             Subjective   Josephine is a 63 year old, presenting for the following health issues:  Neck Pain        5/28/2024     7:41 AM   Additional Questions   Roomed by Yaima LOENE   Accompanied by self     History of Present Illness       Reason for visit:  Neck pain left shoulder pain  Symptom onset:  More than a month  Symptoms include:  Sitting position can cause neck and shoulder to hurt  Symptom intensity:  " "Mild  Symptom progression:  Improving  Had these symptoms before:  No  What makes it worse:  Sitting at computer, looking down  What makes it better:  I do arm and shoulder stretches    She eats 2-3 servings of fruits and vegetables daily.She consumes 0 sweetened beverage(s) daily.She exercises with enough effort to increase her heart rate 20 to 29 minutes per day.  She exercises with enough effort to increase her heart rate 5 days per week.   She is taking medications regularly.         Review of Systems  Constitutional, HEENT, cardiovascular, pulmonary, gi and gu systems are negative, except as otherwise noted.      Objective    /86   Pulse 72   Temp 96.8  F (36  C) (Tympanic)   Resp 18   Ht 1.613 m (5' 3.5\")   Wt 90.3 kg (199 lb)   SpO2 98%   BMI 34.70 kg/m    Body mass index is 34.7 kg/m .  Physical Exam  Vitals and nursing note reviewed.   Constitutional:       General: She is not in acute distress.     Appearance: She is not ill-appearing, toxic-appearing or diaphoretic.   HENT:      Head: Normocephalic and atraumatic.   Neck:     Musculoskeletal:      Cervical back: Normal range of motion and neck supple. No edema, erythema, signs of trauma, rigidity, torticollis or crepitus. Muscular tenderness present. No pain with movement or spinous process tenderness. Normal range of motion.                    Signed Electronically by: Kerrie Rodgers MD    "

## 2024-05-31 ENCOUNTER — TELEPHONE (OUTPATIENT)
Dept: FAMILY MEDICINE | Facility: CLINIC | Age: 64
End: 2024-05-31
Payer: COMMERCIAL

## 2024-05-31 NOTE — TELEPHONE ENCOUNTER
Prior Authorization Retail Medication Request    Medication/Dose: diclofenac (VOLTAREN) 1 % topical gel  Diagnosis and ICD code (if different than what is on RX):  Neck pain [M54.2]  - Primary    Trapezius muscle spasm [M62.838]        New/renewal/insurance change PA/secondary ins. PA:  Previously Tried and Failed:    Rationale:      Insurance   Primary: [facility]  Insurance ID:  [id number]    Secondary (if applicable):  Insurance ID:      Pharmacy Information (if different than what is on RX)  Name:  [facility]  Phone:  [phone]  Fax:[phone]

## 2024-06-11 NOTE — TELEPHONE ENCOUNTER
Prior Authorization Approval    Medication: DICLOFENAC SODIUM 1 % EX GEL  Authorization Effective Date: 5/12/2024  Authorization Expiration Date: 6/11/2025  Insurance Company: [employer] Non-Specialty PA's - Phone [phone] Fax [phone]  Which Pharmacy is filling the prescription: [id number] [address] AT [address] [facility]  Pharmacy Notified: YES  Patient Notified: Instructed pharmacy to notify patient once order is ready.

## 2024-06-24 DIAGNOSIS — M62.838 TRAPEZIUS MUSCLE SPASM: ICD-10-CM

## 2024-06-24 DIAGNOSIS — M54.2 NECK PAIN: ICD-10-CM

## 2024-06-24 RX ORDER — MELOXICAM 7.5 MG/1
7.5 TABLET ORAL DAILY
Qty: 30 TABLET | Refills: 0 | Status: SHIPPED | OUTPATIENT
Start: 2024-06-24 | End: 2024-07-23

## 2024-07-23 DIAGNOSIS — M62.838 TRAPEZIUS MUSCLE SPASM: ICD-10-CM

## 2024-07-23 DIAGNOSIS — M54.2 NECK PAIN: ICD-10-CM

## 2024-07-23 RX ORDER — MELOXICAM 7.5 MG/1
7.5 TABLET ORAL DAILY
Qty: 30 TABLET | Refills: 0 | Status: SHIPPED | OUTPATIENT
Start: 2024-07-23

## 2024-11-07 ENCOUNTER — PATIENT OUTREACH (OUTPATIENT)
Dept: CARE COORDINATION | Facility: CLINIC | Age: 64
End: 2024-11-07
Payer: COMMERCIAL

## 2024-11-29 SDOH — HEALTH STABILITY: PHYSICAL HEALTH: ON AVERAGE, HOW MANY MINUTES DO YOU ENGAGE IN EXERCISE AT THIS LEVEL?: 30 MIN

## 2024-11-29 SDOH — HEALTH STABILITY: PHYSICAL HEALTH: ON AVERAGE, HOW MANY DAYS PER WEEK DO YOU ENGAGE IN MODERATE TO STRENUOUS EXERCISE (LIKE A BRISK WALK)?: 4 DAYS

## 2024-11-29 ASSESSMENT — SOCIAL DETERMINANTS OF HEALTH (SDOH): HOW OFTEN DO YOU GET TOGETHER WITH FRIENDS OR RELATIVES?: ONCE A WEEK

## 2024-12-03 ENCOUNTER — OFFICE VISIT (OUTPATIENT)
Dept: FAMILY MEDICINE | Facility: CLINIC | Age: 64
End: 2024-12-03
Payer: COMMERCIAL

## 2024-12-03 VITALS
HEIGHT: 63 IN | WEIGHT: 196 LBS | HEART RATE: 68 BPM | TEMPERATURE: 97.1 F | RESPIRATION RATE: 20 BRPM | DIASTOLIC BLOOD PRESSURE: 86 MMHG | SYSTOLIC BLOOD PRESSURE: 135 MMHG | BODY MASS INDEX: 34.73 KG/M2 | OXYGEN SATURATION: 100 %

## 2024-12-03 DIAGNOSIS — K21.9 GASTROESOPHAGEAL REFLUX DISEASE WITHOUT ESOPHAGITIS: ICD-10-CM

## 2024-12-03 DIAGNOSIS — D35.2 PITUITARY ADENOMA (H): ICD-10-CM

## 2024-12-03 DIAGNOSIS — Z13.1 SCREENING FOR DIABETES MELLITUS: ICD-10-CM

## 2024-12-03 DIAGNOSIS — Z13.220 LIPID SCREENING: ICD-10-CM

## 2024-12-03 DIAGNOSIS — Z00.00 ROUTINE GENERAL MEDICAL EXAMINATION AT A HEALTH CARE FACILITY: Primary | ICD-10-CM

## 2024-12-03 DIAGNOSIS — I10 ESSENTIAL HYPERTENSION WITH GOAL BLOOD PRESSURE LESS THAN 140/90: ICD-10-CM

## 2024-12-03 DIAGNOSIS — Z12.31 ENCOUNTER FOR SCREENING MAMMOGRAM FOR BREAST CANCER: ICD-10-CM

## 2024-12-03 DIAGNOSIS — E55.9 VITAMIN D DEFICIENCY: ICD-10-CM

## 2024-12-03 DIAGNOSIS — K76.0 FATTY LIVER: ICD-10-CM

## 2024-12-03 LAB
ALBUMIN SERPL BCG-MCNC: 4.5 G/DL (ref 3.5–5.2)
ALP SERPL-CCNC: 63 U/L (ref 40–150)
ALT SERPL W P-5'-P-CCNC: 23 U/L (ref 0–50)
ANION GAP SERPL CALCULATED.3IONS-SCNC: 9 MMOL/L (ref 7–15)
AST SERPL W P-5'-P-CCNC: 22 U/L (ref 0–45)
BILIRUB SERPL-MCNC: 1 MG/DL
BUN SERPL-MCNC: 17.3 MG/DL (ref 8–23)
CALCIUM SERPL-MCNC: 9.2 MG/DL (ref 8.8–10.4)
CHLORIDE SERPL-SCNC: 105 MMOL/L (ref 98–107)
CHOLEST SERPL-MCNC: 187 MG/DL
CREAT SERPL-MCNC: 0.9 MG/DL (ref 0.51–0.95)
EGFRCR SERPLBLD CKD-EPI 2021: 71 ML/MIN/1.73M2
ERYTHROCYTE [DISTWIDTH] IN BLOOD BY AUTOMATED COUNT: 12.8 % (ref 10–15)
EST. AVERAGE GLUCOSE BLD GHB EST-MCNC: 108 MG/DL
FASTING STATUS PATIENT QL REPORTED: YES
FASTING STATUS PATIENT QL REPORTED: YES
GLUCOSE SERPL-MCNC: 114 MG/DL (ref 70–99)
HBA1C MFR BLD: 5.4 % (ref 0–5.6)
HCO3 SERPL-SCNC: 26 MMOL/L (ref 22–29)
HCT VFR BLD AUTO: 39.4 % (ref 35–47)
HDLC SERPL-MCNC: 61 MG/DL
HGB BLD-MCNC: 13.6 G/DL (ref 11.7–15.7)
LDLC SERPL CALC-MCNC: 109 MG/DL
MCH RBC QN AUTO: 31.5 PG (ref 26.5–33)
MCHC RBC AUTO-ENTMCNC: 34.5 G/DL (ref 31.5–36.5)
MCV RBC AUTO: 91 FL (ref 78–100)
NONHDLC SERPL-MCNC: 126 MG/DL
PLATELET # BLD AUTO: 276 10E3/UL (ref 150–450)
POTASSIUM SERPL-SCNC: 4.1 MMOL/L (ref 3.4–5.3)
PROT SERPL-MCNC: 7.4 G/DL (ref 6.4–8.3)
RBC # BLD AUTO: 4.32 10E6/UL (ref 3.8–5.2)
SODIUM SERPL-SCNC: 140 MMOL/L (ref 135–145)
TRIGL SERPL-MCNC: 86 MG/DL
TSH SERPL DL<=0.005 MIU/L-ACNC: 2.12 UIU/ML (ref 0.3–4.2)
VIT D+METAB SERPL-MCNC: 33 NG/ML (ref 20–50)
WBC # BLD AUTO: 5.6 10E3/UL (ref 4–11)

## 2024-12-03 PROCEDURE — 80061 LIPID PANEL: CPT | Performed by: PHYSICIAN ASSISTANT

## 2024-12-03 PROCEDURE — 84443 ASSAY THYROID STIM HORMONE: CPT | Performed by: PHYSICIAN ASSISTANT

## 2024-12-03 PROCEDURE — 80053 COMPREHEN METABOLIC PANEL: CPT | Performed by: PHYSICIAN ASSISTANT

## 2024-12-03 PROCEDURE — 36415 COLL VENOUS BLD VENIPUNCTURE: CPT | Performed by: PHYSICIAN ASSISTANT

## 2024-12-03 PROCEDURE — 99214 OFFICE O/P EST MOD 30 MIN: CPT | Mod: 25 | Performed by: PHYSICIAN ASSISTANT

## 2024-12-03 PROCEDURE — 85027 COMPLETE CBC AUTOMATED: CPT | Performed by: PHYSICIAN ASSISTANT

## 2024-12-03 PROCEDURE — 90673 RIV3 VACCINE NO PRESERV IM: CPT | Performed by: PHYSICIAN ASSISTANT

## 2024-12-03 PROCEDURE — 99396 PREV VISIT EST AGE 40-64: CPT | Mod: 25 | Performed by: PHYSICIAN ASSISTANT

## 2024-12-03 PROCEDURE — 82306 VITAMIN D 25 HYDROXY: CPT | Performed by: PHYSICIAN ASSISTANT

## 2024-12-03 PROCEDURE — 83036 HEMOGLOBIN GLYCOSYLATED A1C: CPT | Performed by: PHYSICIAN ASSISTANT

## 2024-12-03 PROCEDURE — 90471 IMMUNIZATION ADMIN: CPT | Performed by: PHYSICIAN ASSISTANT

## 2024-12-03 RX ORDER — LISINOPRIL 10 MG/1
15 TABLET ORAL DAILY
Qty: 90 TABLET | Refills: 3 | Status: SHIPPED | OUTPATIENT
Start: 2024-12-03

## 2024-12-03 ASSESSMENT — PAIN SCALES - GENERAL: PAINLEVEL_OUTOF10: NO PAIN (0)

## 2024-12-03 NOTE — PROGRESS NOTES
Preventive Care Visit  [facility]  ELLIOTT BRIGHT PA-C, Physician Assistant - Medical  Dec 3, 2024      Assessment & Plan   The patient is a 64-year-old female presenting for her annual wellness exam.  Routine general medical examination at a health care facility  Plan include ensuring age-appropriate screenings, including mammogram, colonoscopy, along with routine labs such as lipid panel and HbA1c.  Vaccination will be updated with seasonal influenza.  She denies shingles vaccines at this time.  Chronic condition was reviewed and optimized, and lifestyle counseling will focus on healthy diet, and regular exercise.  Follow-up will be scheduled as needed for lab review or chronic disease management.    Encounter for screening mammogram for breast cancer  Due for an annual screening mammogram as per screening guidelines.  - MA Screen Bilateral w/Riley; Future    Essential hypertension with goal blood pressure less than 140/90  Current blood pressure readings reviewed, and these are above target BP <138/80 mmHg.  Increase lisinopril from 10 mg to 15 mg/day.  Reinforced adherence to lifestyle modifications, including a low-sodium diet, regular exercise, and weight management.  - lisinopril (ZESTRIL) 10 MG tablet; Take 1.5 tablets (15 mg) by mouth daily.    Screening for diabetes mellitus  Plan includes ordering HbA1c to assess for diabetes or prediabetes.  Screening will be repeated per guidelines based on risk factors and results.    Pituitary adenoma (H)  Given the presenting symptoms of eye complaints, she has been referred to ophthalmology for further assessment.  Continue to follow-up with endocrinology for management of pituitary adenoma.  - TSH with free T4 reflex; Future  - Adult Eye  Referral; Future    Lipid screening  Plan includes ordering lipid profile.  Encouraged healthy diet, regular exercise, and weight management to reduce risk factors.  Screening will be repeated  "per guidelines based on risk factors results.    Gastroesophageal reflux disease without esophagitis  Plan includes continuing omeprazole for symptom control.  Recommend lifestyle modifications, including weight management, avoiding trigger foods, eating smaller meals, elevating the head of bed.  Monitor for alarm symptoms such as dysphagia, weight loss, or GI bleeding, and consider endoscopy if these develop.  Follow-up as needed to reassess symptoms and treatment efficacy.  - omeprazole (PRILOSEC) 20 MG DR capsule; TAKE 1 CAPSULE(20 MG) BY MOUTH TWICE DAILY,      BMI  Estimated body mass index is 34.45 kg/m  as calculated from the following:    Height as of this encounter: 1.607 m (5' 3.25\").    Weight as of this encounter: 88.9 kg (196 lb).   Weight management plan: Discussed healthy diet and exercise guidelines    Counseling  Appropriate preventive services were addressed with this patient via screening, questionnaire, or discussion as appropriate for fall prevention, nutrition, physical activity, Tobacco-use cessation, social engagement, weight loss and cognition.  Checklist reviewing preventive services available has been given to the patient.  Reviewed patient's diet, addressing concerns and/or questions.   She is at risk for psychosocial distress and has been provided with information to reduce risk.       MEDICATIONS:        - Increase Lisinopril from 10 mg to 15 mg       - Continue other medications without change  Work on weight loss  Regular exercise    Juan Burton is a 64 year old, presenting for the following:  Physical        12/3/2024     7:51 AM   Additional Questions   Roomed by ke   Accompanied by self         12/3/2024     7:51 AM   Patient Reported Additional Medications   Patient reports taking the following new medications see chart            Well Women Physical Exam:    Date of last exam: 12/07/2023    LMP: Menopause  Fertility history: G 0 P 5  Birth Control: None  STD " Screening: None    PAP/Cervical Cancer Screening: Hysterectomy, PAP no longer indicated  Mammogram: Recommend annually. Last one Dec 2023.  Colon Cancer Screening: Colonoscopy 2023, tubular adenoma.   DXA: No risk factors. Not required due to age  Immunizations: Needs influenza vaccine, Up to date on COVID-19 vaccine.    Smoker: Quit 2012, Social smoker  Interested in Smoking Cessation: N/A  Alcohol Use: Socially    Diet: Heart healthy diet, high fiber, low carb  Exercise: Yes, Bib, Walking   Supplements: Vit. D, Calcium, B12, Fish oil  Cholesterol Screening: Previous completed on 12/07/2023. Due today.  Diabetes Screening: Previous completed on 12/07/2023.Due today.    Depression and Anxiety Screening: Denies any symptoms at this time    Other Concerns: Complains of strain on eyes while driving.  Denies any headache, blurry vision, or any change in peripheral vision.  She has concerns if this is due to to her pituitary adenoma and whether she needs a referral to see an ophthalmologist.  She follows up with endocrinology for pituitary adenoma.  In addition, she complains of dilated veins in right leg some venous stasis and right foot.  Denies any swelling, pain, tenderness, aching at night.  He also has history of hypertension which is managed with lisinopril.          Health Care Directive  Patient does not have a Health Care Directive: Patient states has Advance Directive and will bring in a copy to clinic.      11/29/2024   General Health   How would you rate your overall physical health? Good   Feel stress (tense, anxious, or unable to sleep) To some extent      (!) STRESS CONCERN      11/29/2024   Nutrition   Three or more servings of calcium each day? Yes   Diet: Regular (no restrictions)    Carbohydrate counting   How many servings of fruit and vegetables per day? (!) 2-3   How many sweetened beverages each day? 0-1       Multiple values from one day are sorted in reverse-chronological order          2024   Exercise   Days per week of moderate/strenous exercise 4 days   Average minutes spent exercising at this level 30 min            2024   Social Factors   Frequency of gathering with friends or relatives Once a week   Worry food won't last until get money to buy more No   Food not last or not have enough money for food? No   Do you have housing? (Housing is defined as stable permanent housing and does not include staying ouside in a car, in a tent, in an abandoned building, in an overnight shelter, or couch-surfing.) Yes   Are you worried about losing your housing? No   Lack of transportation? No   Unable to get utilities (heat,electricity)? No            2024   Fall Risk   Fallen 2 or more times in the past year? No    Trouble with walking or balance? No        Patient-reported          2024   Dental   Dentist two times every year? Yes            2024   TB Screening   Were you born outside of the [place]? No              Today's PHQ-2 Score:       2024     9:49 AM   PHQ-2 (  [employer])   Q1: Little interest or pleasure in doing things 0   Q2: Feeling down, depressed or hopeless 0   PHQ-2 Score 0         2024   Substance Use   Alcohol more than 3/day or more than 7/wk No   Do you use any other substances recreationally? No        Social History     Tobacco Use    Smoking status: Former     Current packs/day: 0.00     Types: Cigarettes     Quit date: 2012     Years since quittin.8     Passive exposure: Never    Smokeless tobacco: Never   Vaping Use    Vaping status: Never Used   Substance Use Topics    Alcohol use: Yes     Comment: GLASS OF WINE EVERY NIGHT    Drug use: No           2023   LAST FHS-7 RESULTS   1st degree relative breast or ovarian cancer Yes           Mammogram Screening - Mammogram every 1-2 years updated in Health Maintenance based on mutual decision making        2024   STI Screening   New sexual partner(s) since last STI/HIV test? No         History of abnormal Pap smear: Status post hysterectomy. Pap not indicated.        ASCVD Risk   The 10-year ASCVD risk score (Sivakumar QUIJANO, et al., 2019) is: 7.2%    Values used to calculate the score:      Age: 64 years      Sex: Female      Is Non- : No      Diabetic: No      Tobacco smoker: No      Systolic Blood Pressure: 135 mmHg      Is BP treated: Yes      HDL Cholesterol: 61 mg/dL      Total Cholesterol: 207 mg/dL    Fracture Risk Assessment Tool  Link to Frax Calculator  Use the information below to complete the Frax calculator  : 1960  Sex: female  Weight (kg): 88.9 kg (actual weight)  Height (cm): 160.7 cm  Previous Fragility Fracture:  No  History of parent with fractured hip:  No  Current Smoking:  No  Patient has been on glucocorticoids for more than 3 months (5mg/day or more): No  Rheumatoid Arthritis on Problem List:  No  Secondary Osteoporosis on Problem List:  No  Consumes 3 or more units of alcohol per day: No  Femoral Neck BMD (g/cm2)           Reviewed and updated as needed this visit by Provider                    Past Medical History:   Diagnosis Date    Endometriosis     Fatty liver     mild elevated liver enzymes    GERD (gastroesophageal reflux disease)     HTN (hypertension), benign     IBS (irritable bowel syndrome)     LA (lymphadenopathy) - ? 2012    LA (lymphadenopathy) - ?     Seasonal allergies     Vertigo      Past Surgical History:   Procedure Laterality Date    APPENDECTOMY  2019    BIOPSY      BIOPSY BREAST      COLONOSCOPY  2013    Procedure: COLONOSCOPY;  Colonoscopy, screening;  Surgeon: Gaetano Saravia MD;  Location:  OR    COLONOSCOPY N/A 4/10/2023    Procedure: COLONOSCOPY, FLEXIBLE, WITH LESION REMOVAL USING SNARE;  Surgeon: Rafael Garcia MD;  Location: [facility] LAPAROSCOPIC MYOMECTOMY, 1 - 4 INTRAMURAL MYOMAS =<250 GM       REMOVE TONSILS/ADENOIDS,<11 Y/O      HYSTERECTOMY, PAP NO LONGER  INDICATED      HYSTERECTOMY, NADER      secondary to menorrhagia    TUBAL/ECTOPIC PREGNANCY       OB History   No obstetric history on file.     BP Readings from Last 3 Encounters:   24 135/86   24 132/86   24 (!) 158/90    Wt Readings from Last 3 Encounters:   24 88.9 kg (196 lb)   24 90.3 kg (199 lb)   24 87.5 kg (193 lb)                  Patient Active Problem List   Diagnosis    Colloid cyst of third ventricle (H)    Anxiety    Fatty liver    Seasonal allergies    CARDIOVASCULAR SCREENING; LDL GOAL LESS THAN 130    GERD (gastroesophageal reflux disease)    Shingles    RUQ abdominal pain    Vitamin D deficiency    Obesity    HCD (health care directive)    Essential hypertension with goal blood pressure less than 140/90    Non morbid obesity, unspecified obesity type    Pituitary adenoma (H)     Past Surgical History:   Procedure Laterality Date    APPENDECTOMY  2019    BIOPSY      BIOPSY BREAST      COLONOSCOPY  2013    Procedure: COLONOSCOPY;  Colonoscopy, screening;  Surgeon: Gaetano Saravia MD;  Location: [facility] OR    COLONOSCOPY N/A 4/10/2023    Procedure: COLONOSCOPY, FLEXIBLE, WITH LESION REMOVAL USING SNARE;  Surgeon: Rafael Garcia MD;  Location: [facility]     LAPAROSCOPIC MYOMECTOMY, 1 - 4 INTRAMURAL MYOMAS =<250 GM      HC REMOVE TONSILS/ADENOIDS,<11 Y/O      HYSTERECTOMY, PAP NO LONGER INDICATED      HYSTERECTOMY, NADER      secondary to menorrhagia    TUBAL/ECTOPIC PREGNANCY         Social History     Tobacco Use    Smoking status: Former     Current packs/day: 0.00     Types: Cigarettes     Quit date: 2012     Years since quittin.8     Passive exposure: Never    Smokeless tobacco: Never   Substance Use Topics    Alcohol use: Yes     Comment: GLASS OF WINE EVERY NIGHT     Family History   Problem Relation Age of Onset    Eye Disorder Mother         GLAUCOMA    Gastrointestinal Disease Mother         TWISTED BOWEL     Gynecology Mother         UTERINE CA /BREAST    Neurologic Disorder Mother         Migraines    Breast Cancer Mother     Glaucoma Mother     Dementia Mother     Diabetes Father     Blood Disease Sister         HEPATITIS C    Unknown/Adopted Sister     Breast Cancer Sister     Eye Disorder Maternal Grandmother         GLAUCOMA    Cerebrovascular Disease Maternal Grandmother     Glaucoma Maternal Grandmother     Diabetes Maternal Grandfather     Cerebrovascular Disease Maternal Grandfather          Current Outpatient Medications   Medication Sig Dispense Refill    Black Cohosh 40 MG CAPS Take by mouth daily as needed      CALCIUM 600 + D OR ONCE A DAY      diazepam (VALIUM) 2 MG tablet Take 1-2 tablets 1 hour before MRI 5 tablet 0    EVENING PRIMROSE OIL PO Take by mouth daily as needed      FISH OIL ONCE A DAY      IBUPROFEN 200 MG OR TABS 1-2 TABLETS on as needed basis      lisinopril (ZESTRIL) 10 MG tablet Take 1.5 tablets (15 mg) by mouth daily. 90 tablet 3    meclizine (ANTIVERT) 12.5 MG tablet Take 2 tablets (25 mg) by mouth 3 times daily as needed 90 tablet 0    MIRALAX OR AS NEEDED      omeprazole (PRILOSEC) 20 MG DR capsule TAKE 1 CAPSULE(20 MG) BY MOUTH TWICE DAILY, 180 capsule 3    VITAMIN D, CHOLECALCIFEROL, PO Take 1,000 Units by mouth daily       Allergies   Allergen Reactions    Contrast Dye     Diatrizoate Other (See Comments)     Very heavy pressure in the chest, patient reported very anxious after it.     Recent Labs   Lab Test 12/03/24  0856 01/04/24  1025 12/07/23  0905 11/04/22  0806 11/04/22  0806 08/11/21  1522 07/09/20  0707 01/04/19  0736   A1C 5.4  --  5.4  --  5.5  --   --  5.4   *  --  129*  --  128*   < > 114* 95   HDL 61  --  61  --  64   < > 59 53   TRIG 86  --  84  --  94   < > 125 85   ALT 23  --  26  --  37  --   --   --    CR 0.90 0.84 0.92  --  0.89   < > 0.94 1.01   GFRESTIMATED 71 78 70  --  73   < > 66 61   GFRESTBLACK  --   --   --   --   --   --  77 71   POTASSIUM 4.1  "4.2 4.1   < > 4.2   < > 4.4 4.4   TSH 2.12 2.22  --   --   --   --   --   --     < > = values in this interval not displayed.          Review of Systems  Constitutional, HEENT, cardiovascular, pulmonary, gi and gu systems are negative, except as otherwise noted.     Objective    Exam  /86   Pulse 68   Temp 97.1  F (36.2  C) (Tympanic)   Resp 20   Ht 1.607 m (5' 3.25\")   Wt 88.9 kg (196 lb)   SpO2 100%   BMI 34.45 kg/m     Estimated body mass index is 34.45 kg/m  as calculated from the following:    Height as of this encounter: 1.607 m (5' 3.25\").    Weight as of this encounter: 88.9 kg (196 lb).    Physical Exam  Vitals reviewed.   Constitutional:       General: She is awake.      Appearance: Normal appearance. She is well-developed. She is not ill-appearing or toxic-appearing.   HENT:      Head: Normocephalic and atraumatic.      Jaw: No trismus.      Right Ear: Tympanic membrane, ear canal and external ear normal.      Left Ear: Tympanic membrane, ear canal and external ear normal.      Nose: Nose normal. No congestion or rhinorrhea.      Mouth/Throat:      Lips: Pink.      Mouth: Mucous membranes are moist.      Dentition: Normal dentition.      Tongue: No lesions. Tongue does not deviate from midline.      Pharynx: Oropharynx is clear. Uvula midline. No oropharyngeal exudate or posterior oropharyngeal erythema.   Eyes:      General: Lids are normal. No allergic shiner.     Extraocular Movements: Extraocular movements intact.      Conjunctiva/sclera: Conjunctivae normal.      Pupils: Pupils are equal, round, and reactive to light.   Neck:      Thyroid: No thyroid mass, thyromegaly or thyroid tenderness.      Trachea: Trachea normal.   Cardiovascular:      Rate and Rhythm: Normal rate and regular rhythm.      Pulses:           Posterior tibial pulses are 2+ on the right side and 2+ on the left side.      Heart sounds: Normal heart sounds. No murmur heard.  Pulmonary:      Effort: Pulmonary effort is " normal.      Breath sounds: Normal breath sounds and air entry.   Abdominal:      General: Bowel sounds are normal.      Palpations: Abdomen is soft.      Tenderness: There is no abdominal tenderness.      Hernia: No hernia is present.   Musculoskeletal:      Cervical back: Normal range of motion.      Right lower leg: No edema.      Left lower leg: No edema.      Comments: Ambulatory without assistive device  Limbs with grossly normal AROM   Lymphadenopathy:      Head:      Right side of head: No submental, submandibular, tonsillar, preauricular or posterior auricular adenopathy.      Left side of head: No submental, submandibular, tonsillar, preauricular or posterior auricular adenopathy.      Cervical: No cervical adenopathy.      Right cervical: No superficial cervical adenopathy.     Left cervical: No superficial cervical adenopathy.      Upper Body:      Right upper body: No supraclavicular adenopathy.      Left upper body: No supraclavicular adenopathy.   Skin:     General: Skin is warm and dry.      Capillary Refill: Capillary refill takes 2 to 3 seconds.      Findings: No lesion, rash or wound.   Neurological:      General: No focal deficit present.      Mental Status: She is alert and oriented to person, place, and time.      Motor: Motor function is intact.      Coordination: Coordination is intact.      Gait: Gait is intact.      Deep Tendon Reflexes:      Reflex Scores:       Patellar reflexes are 2+ on the right side and 2+ on the left side.     Comments: CN II-XII grossly intact   Psychiatric:         Mood and Affect: Mood normal.         Speech: Speech normal.         Behavior: Behavior is cooperative.         Thought Content: Thought content normal.         Cognition and Memory: Cognition normal.         Judgment: Judgment normal.       Documentation completed by: BRANDON Gilbert    I was present for the evaluation of patient. I verify that documentation is correct and true. CAIO Nunez  [place]      Signed Electronically by: ELLIOTT BRIGHT PA-C

## 2024-12-03 NOTE — PATIENT INSTRUCTIONS
Patient Education   Preventive Care Advice   This is general advice given by our system to help you stay healthy. However, your care team may have specific advice just for you. Please talk to your care team about your preventive care needs.  Nutrition  Eat 5 or more servings of fruits and vegetables each day.  Try wheat bread, brown rice and whole grain pasta (instead of white bread, rice, and pasta).  Get enough calcium and vitamin D. Check the label on foods and aim for 100% of the RDA (recommended daily allowance).  Lifestyle  Exercise at least 150 minutes each week  (30 minutes a day, 5 days a week).  Do muscle strengthening activities 2 days a week. These help control your weight and prevent disease.  No smoking.  Wear sunscreen to prevent skin cancer.  Have a dental exam and cleaning every 6 months.  Yearly exams  See your health care team every year to talk about:  Any changes in your health.  Any medicines your care team has prescribed.  Preventive care, family planning, and ways to prevent chronic diseases.  Shots (vaccines)   HPV shots (up to age 26), if you've never had them before.  Hepatitis B shots (up to age 59), if you've never had them before.  COVID-19 shot: Get this shot when it's due.  Flu shot: Get a flu shot every year.  Tetanus shot: Get a tetanus shot every 10 years.  Pneumococcal, hepatitis A, and RSV shots: Ask your care team if you need these based on your risk.  Shingles shot (for age 50 and up)  General health tests  Diabetes screening:  Starting at age 35, Get screened for diabetes at least every 3 years.  If you are younger than age 35, ask your care team if you should be screened for diabetes.  Cholesterol test: At age 39, start having a cholesterol test every 5 years, or more often if advised.  Bone density scan (DEXA): At age 50, ask your care team if you should have this scan for osteoporosis (brittle bones).  Hepatitis C: Get tested at least once in your life.  STIs (sexually  transmitted infections)  Before age 24: Ask your care team if you should be screened for STIs.  After age 24: Get screened for STIs if you're at risk. You are at risk for STIs (including HIV) if:  You are sexually active with more than one person.  You don't use condoms every time.  You or a partner was diagnosed with a sexually transmitted infection.  If you are at risk for HIV, ask about PrEP medicine to prevent HIV.  Get tested for HIV at least once in your life, whether you are at risk for HIV or not.  Cancer screening tests  Cervical cancer screening: If you have a cervix, begin getting regular cervical cancer screening tests starting at age 21.  Breast cancer scan (mammogram): If you've ever had breasts, begin having regular mammograms starting at age 40. This is a scan to check for breast cancer.  Colon cancer screening: It is important to start screening for colon cancer at age 45.  Have a colonoscopy test every 10 years (or more often if you're at risk) Or, ask your provider about stool tests like a FIT test every year or Cologuard test every 3 years.  To learn more about your testing options, visit:   .  For help making a decision, visit:   https://bit.ly/xy76021.  Prostate cancer screening test: If you have a prostate, ask your care team if a prostate cancer screening test (PSA) at age 55 is right for you.  Lung cancer screening: If you are a current or former smoker ages 50 to 80, ask your care team if ongoing lung cancer screenings are right for you.  For informational purposes only. Not to replace the advice of your health care provider. Copyright   2023 [facility]. All rights reserved. Clinically reviewed by the [facility]itions [facility]. PAYMILL 192504 - REV 01/24.  Learning About Stress  What is stress?     Stress is your body's response to a hard situation. Your body can have a physical, emotional, or mental response. Stress is a fact of life for most people, and it  affects everyone differently. What causes stress for you may not be stressful for someone else.  A lot of things can cause stress. You may feel stress when you go on a job interview, take a test, or run a race. This kind of short-term stress is normal and even useful. It can help you if you need to work hard or react quickly. For example, stress can help you finish an important job on time.  Long-term stress is caused by ongoing stressful situations or events. Examples of long-term stress include long-term health problems, ongoing problems at work, or conflicts in your family. Long-term stress can harm your health.  How does stress affect your health?  When you are stressed, your body responds as though you are in danger. It makes hormones that speed up your heart, make you breathe faster, and give you a burst of energy. This is called the fight-or-flight stress response. If the stress is over quickly, your body goes back to normal and no harm is done.  But if stress happens too often or lasts too long, it can have bad effects. Long-term stress can make you more likely to get sick, and it can make symptoms of some diseases worse. If you tense up when you are stressed, you may develop neck, shoulder, or low back pain. Stress is linked to high blood pressure and heart disease.  Stress also harms your emotional health. It can make you blanchard, tense, or depressed. Your relationships may suffer, and you may not do well at work or school.  What can you do to manage stress?  You can try these things to help manage stress:   Do something active. Exercise or activity can help reduce stress. Walking is a great way to get started. Even everyday activities such as housecleaning or yard work can help.  Try yoga or raj chi. These techniques combine exercise and meditation. You may need some training at first to learn them.  Do something you enjoy. For example, listen to music or go to a movie. Practice your hobby or do volunteer  "work.  Meditate. This can help you relax, because you are not worrying about what happened before or what may happen in the future.  Do guided imagery. Imagine yourself in any setting that helps you feel calm. You can use online videos, books, or a teacher to guide you.  Do breathing exercises. For example:  From a standing position, bend forward from the waist with your knees slightly bent. Let your arms dangle close to the floor.  Breathe in slowly and deeply as you return to a standing position. Roll up slowly and lift your head last.  Hold your breath for just a few seconds in the standing position.  Breathe out slowly and bend forward from the waist.  Let your feelings out. Talk, laugh, cry, and express anger when you need to. Talking with supportive friends or family, a counselor, or a sammi leader about your feelings is a healthy way to relieve stress. Avoid discussing your feelings with people who make you feel worse.  Write. It may help to write about things that are bothering you. This helps you find out how much stress you feel and what is causing it. When you know this, you can find better ways to cope.  What can you do to prevent stress?  You might try some of these things to help prevent stress:  Manage your time. This helps you find time to do the things you want and need to do.  Get enough sleep. Your body recovers from the stresses of the day while you are sleeping.  Get support. Your family, friends, and community can make a difference in how you experience stress.  Limit your news feed. Avoid or limit time on social media or news that may make you feel stressed.  Do something active. Exercise or activity can help reduce stress. Walking is a great way to get started.  Where can you learn more?  Go to https://www.[employer].net/patiented  Enter N032 in the search box to learn more about \"Learning About Stress.\"  Current as of: October 24, 2023  Content Version: 14.2 2024 [employer]. "   Care instructions adapted under license by your healthcare professional. If you have questions about a medical condition or this instruction, always ask your healthcare professional. [employer] disclaims any warranty or liability for your use of this information.

## 2024-12-04 ENCOUNTER — PATIENT OUTREACH (OUTPATIENT)
Dept: CARE COORDINATION | Facility: CLINIC | Age: 64
End: 2024-12-04
Payer: COMMERCIAL

## 2025-03-20 ENCOUNTER — HOSPITAL ENCOUNTER (OUTPATIENT)
Dept: MAMMOGRAPHY | Facility: CLINIC | Age: 65
Discharge: HOME | End: 2025-03-20
Attending: PHYSICIAN ASSISTANT
Payer: COMMERCIAL

## 2025-03-20 DIAGNOSIS — Z12.31 ENCOUNTER FOR SCREENING MAMMOGRAM FOR BREAST CANCER: ICD-10-CM

## 2025-03-20 PROCEDURE — 77063 BREAST TOMOSYNTHESIS BI: CPT

## 2025-03-31 ENCOUNTER — TELEPHONE (OUTPATIENT)
Dept: FAMILY MEDICINE | Facility: CLINIC | Age: 65
End: 2025-03-31
Payer: COMMERCIAL

## 2025-03-31 NOTE — TELEPHONE ENCOUNTER
Prior Authorization Retail Medication Request    Medication/Dose: omeprazole (PRILOSEC) 20 MG DR capsule  Diagnosis and ICD code (if different than what is on RX):  Gastroesophageal reflux disease without esophagitis [K21.9]   New/renewal/insurance change PA/secondary ins. PA:  Previously Tried and Failed:    Rationale:      Insurance   Primary: Phone #: [phone]  Insurance ID:  [id number]    Secondary (if applicable):  Insurance ID:      Pharmacy Information (if different than what is on RX)  Name:  [facility]  Phone:  [phone]  Fax:[phone]    Clinic Information  Preferred routing pool for dept communication: [facility]

## 2025-04-01 NOTE — TELEPHONE ENCOUNTER
PA Initiation    Medication: OMEPRAZOLE 20 MG PO CPDR  Insurance Company: [employer] Non-Specialty PA's - Phone [phone] Fax [phone]  Pharmacy Filling the Rx: [id number] [place] [address] [address] [facility] [address]  Filling Pharmacy Phone: [phone]  Filling Pharmacy Fax:    Start Date: 3/31/2025

## 2025-04-08 NOTE — TELEPHONE ENCOUNTER
Prior Authorization Approval    Medication: OMEPRAZOLE 20 MG PO CPDR  Authorization Effective Date: 3/4/2025  Authorization Expiration Date: 4/3/2026  Approved Dose/Quantity:   Reference #:     Insurance Company: [employer] Non-Specialty PA's - Phone [phone] Fax [phone]  Expected CoPay: $    CoPay Card Available:      Financial Assistance Needed:   [id number] [address] AT [facility]  Pharmacy Notified: Y  Patient Notified: Instructed pharmacy to notify patient once order is ready.

## 2025-04-16 ENCOUNTER — OFFICE VISIT (OUTPATIENT)
Dept: FAMILY MEDICINE | Facility: CLINIC | Age: 65
End: 2025-04-16
Payer: COMMERCIAL

## 2025-04-16 VITALS
WEIGHT: 200 LBS | DIASTOLIC BLOOD PRESSURE: 94 MMHG | SYSTOLIC BLOOD PRESSURE: 155 MMHG | BODY MASS INDEX: 35.44 KG/M2 | TEMPERATURE: 98.1 F | HEIGHT: 63 IN | RESPIRATION RATE: 17 BRPM | OXYGEN SATURATION: 96 % | HEART RATE: 89 BPM

## 2025-04-16 DIAGNOSIS — R10.31 RLQ ABDOMINAL PAIN: Primary | ICD-10-CM

## 2025-04-16 LAB
ALBUMIN UR-MCNC: NEGATIVE MG/DL
APPEARANCE UR: CLEAR
BACTERIA #/AREA URNS HPF: ABNORMAL /HPF
BASOPHILS # BLD AUTO: 0 10E3/UL (ref 0–0.2)
BASOPHILS NFR BLD AUTO: 1 %
BILIRUB UR QL STRIP: NEGATIVE
COLOR UR AUTO: YELLOW
EOSINOPHIL # BLD AUTO: 0.2 10E3/UL (ref 0–0.7)
EOSINOPHIL NFR BLD AUTO: 3 %
ERYTHROCYTE [DISTWIDTH] IN BLOOD BY AUTOMATED COUNT: 12.7 % (ref 10–15)
GLUCOSE UR STRIP-MCNC: NEGATIVE MG/DL
HCT VFR BLD AUTO: 40 % (ref 35–47)
HGB BLD-MCNC: 13.7 G/DL (ref 11.7–15.7)
HGB UR QL STRIP: ABNORMAL
IMM GRANULOCYTES # BLD: 0 10E3/UL
IMM GRANULOCYTES NFR BLD: 0 %
KETONES UR STRIP-MCNC: NEGATIVE MG/DL
LEUKOCYTE ESTERASE UR QL STRIP: NEGATIVE
LYMPHOCYTES # BLD AUTO: 2 10E3/UL (ref 0.8–5.3)
LYMPHOCYTES NFR BLD AUTO: 26 %
MCH RBC QN AUTO: 31.3 PG (ref 26.5–33)
MCHC RBC AUTO-ENTMCNC: 34.3 G/DL (ref 31.5–36.5)
MCV RBC AUTO: 91 FL (ref 78–100)
MONOCYTES # BLD AUTO: 0.7 10E3/UL (ref 0–1.3)
MONOCYTES NFR BLD AUTO: 9 %
MUCOUS THREADS #/AREA URNS LPF: PRESENT /LPF
NEUTROPHILS # BLD AUTO: 4.8 10E3/UL (ref 1.6–8.3)
NEUTROPHILS NFR BLD AUTO: 62 %
NITRATE UR QL: NEGATIVE
PH UR STRIP: 6 [PH] (ref 5–7)
PLATELET # BLD AUTO: 299 10E3/UL (ref 150–450)
RBC # BLD AUTO: 4.38 10E6/UL (ref 3.8–5.2)
RBC #/AREA URNS AUTO: ABNORMAL /HPF
SP GR UR STRIP: 1.02 (ref 1–1.03)
SQUAMOUS #/AREA URNS AUTO: ABNORMAL /LPF
UROBILINOGEN UR STRIP-ACNC: 0.2 E.U./DL
WBC # BLD AUTO: 7.8 10E3/UL (ref 4–11)
WBC #/AREA URNS AUTO: ABNORMAL /HPF

## 2025-04-16 PROCEDURE — 81001 URINALYSIS AUTO W/SCOPE: CPT | Performed by: FAMILY MEDICINE

## 2025-04-16 PROCEDURE — 85025 COMPLETE CBC W/AUTO DIFF WBC: CPT | Performed by: FAMILY MEDICINE

## 2025-04-16 PROCEDURE — G2211 COMPLEX E/M VISIT ADD ON: HCPCS | Performed by: FAMILY MEDICINE

## 2025-04-16 PROCEDURE — 36415 COLL VENOUS BLD VENIPUNCTURE: CPT | Performed by: FAMILY MEDICINE

## 2025-04-16 PROCEDURE — 3080F DIAST BP >= 90 MM HG: CPT | Performed by: FAMILY MEDICINE

## 2025-04-16 PROCEDURE — 3077F SYST BP >= 140 MM HG: CPT | Performed by: FAMILY MEDICINE

## 2025-04-16 PROCEDURE — 1125F AMNT PAIN NOTED PAIN PRSNT: CPT | Performed by: FAMILY MEDICINE

## 2025-04-16 PROCEDURE — 99214 OFFICE O/P EST MOD 30 MIN: CPT | Performed by: FAMILY MEDICINE

## 2025-04-16 ASSESSMENT — PAIN SCALES - GENERAL: PAINLEVEL_OUTOF10: MILD PAIN (3)

## 2025-04-16 NOTE — PROGRESS NOTES
Assessment & Plan     RLQ abdominal pain  Await US/await UC  Consider miralax for next few days to r/o constipation  If all negative, consider gen surg consult to assess for hernia  - **CBC with platelets differential FUTURE 2mo; Future  - UA Macroscopic with reflex to Microscopic and Culture - Lab Collect; Future  - US Pelvic Complete with Transvaginal; Future  - Adult Gen Surg  Referral; Future  - **CBC with platelets differential FUTURE 2mo  - UA Macroscopic with reflex to Microscopic and Culture - Lab Collect  - UA Microscopic with Reflex to Culture  - Urine Culture Aerobic Bacterial - lab collect; Future  - Urine Culture Aerobic Bacterial - lab collect      The longitudinal plan of care for the diagnosis(es)/condition(s) as documented were addressed during this visit. Due to the added complexity in care, I will continue to support Asher in the subsequent management and with ongoing continuity of care.            Juan Burton is a 64 year old, presenting for the following health issues:  Pelvic Pain      4/16/2025     2:48 PM   Additional Questions   Roomed by asher   Accompanied by self     History of Present Illness       Reason for visit:  Lower pain, ovary or strained muscle  Symptom onset:  3-7 days ago  Symptoms include:  Lower tenderness  Symptom intensity:  Mild  Symptom progression:  Staying the same  Had these symptoms before:  No  What makes it worse:  Poking and pushing around the area tends to make it worse   She is taking medications regularly.        H/o tubal pregnancy on right side  Has h/o hysterectomy  Has h/o appendix removal  Pain in on right side  Felt heavier lump  No urinary sxs  No stooling issues. No black or bloody stools  No constipation.   No urinary issues,  No fever  Pain in right lower quadrant  Still has ovaries.       Review of Systems  Constitutional, HEENT, cardiovascular, pulmonary, gi and gu systems are negative, except as otherwise noted.      Objective   "  BP (!) 155/94   Pulse 89   Temp 98.1  F (36.7  C) (Oral)   Resp 17   Ht 1.607 m (5' 3.25\")   Wt 90.7 kg (200 lb)   SpO2 96%   BMI 35.15 kg/m    Body mass index is 35.15 kg/m .  Physical Exam   GENERAL: alert and no distress  RESP: lungs clear to auscultation - no rales, rhonchi or wheezes  CV: regular rate and rhythm, normal S1 S2, no S3 or S4, no murmur, click or rub, no peripheral edema   ABDOMEN: tenderness RLQ and bowel sounds normal    Results for orders placed or performed in visit on 04/16/25 (from the past 24 hours)   **CBC with platelets differential FUTURE 2mo    Narrative    The following orders were created for panel order **CBC with platelets differential FUTURE 2mo.  Procedure                               Abnormality         Status                     ---------                               -----------         ------                     CBC with platelets and ...[[id number]]                      Final result                 Please view results for these tests on the individual orders.   UA Macroscopic with reflex to Microscopic and Culture - Lab Collect    Specimen: Urine, Clean Catch   Result Value Ref Range    Color Urine Yellow Colorless, Straw, Light Yellow, Yellow    Appearance Urine Clear Clear    Glucose Urine Negative Negative mg/dL    Bilirubin Urine Negative Negative    Ketones Urine Negative Negative mg/dL    Specific Gravity Urine 1.020 1.003 - 1.035    Blood Urine Trace (A) Negative    pH Urine 6.0 5.0 - 7.0    Protein Albumin Urine Negative Negative mg/dL    Urobilinogen Urine 0.2 0.2, 1.0 E.U./dL    Nitrite Urine Negative Negative    Leukocyte Esterase Urine Negative Negative   CBC with platelets and differential   Result Value Ref Range    WBC Count 7.8 4.0 - 11.0 10e3/uL    RBC Count 4.38 3.80 - 5.20 10e6/uL    Hemoglobin 13.7 11.7 - 15.7 g/dL    Hematocrit 40.0 35.0 - 47.0 %    MCV 91 78 - 100 fL    MCH 31.3 26.5 - 33.0 pg    MCHC 34.3 31.5 - 36.5 g/dL    RDW 12.7 10.0 - " 15.0 %    Platelet Count 299 150 - 450 10e3/uL    % Neutrophils 62 %    % Lymphocytes 26 %    % Monocytes 9 %    % Eosinophils 3 %    % Basophils 1 %    % Immature Granulocytes 0 %    Absolute Neutrophils 4.8 1.6 - 8.3 10e3/uL    Absolute Lymphocytes 2.0 0.8 - 5.3 10e3/uL    Absolute Monocytes 0.7 0.0 - 1.3 10e3/uL    Absolute Eosinophils 0.2 0.0 - 0.7 10e3/uL    Absolute Basophils 0.0 0.0 - 0.2 10e3/uL    Absolute Immature Granulocytes 0.0 <=0.4 10e3/uL   UA Microscopic with Reflex to Culture   Result Value Ref Range    Bacteria Urine Moderate (A) None Seen /HPF    RBC Urine 0-2 0-2 /HPF /HPF    WBC Urine 0-5 0-5 /HPF /HPF    Squamous Epithelials Urine Many (A) None Seen /LPF    Mucus Urine Present (A) None Seen /LPF    Narrative    Urine Culture not indicated           Signed Electronically by: Greer Sanchez MD

## 2025-04-22 ENCOUNTER — ANCILLARY PROCEDURE (OUTPATIENT)
Dept: ULTRASOUND IMAGING | Facility: CLINIC | Age: 65
End: 2025-04-22
Attending: FAMILY MEDICINE
Payer: COMMERCIAL

## 2025-04-22 DIAGNOSIS — R10.31 RLQ ABDOMINAL PAIN: ICD-10-CM

## 2025-04-22 PROCEDURE — 76830 TRANSVAGINAL US NON-OB: CPT | Mod: TC | Performed by: RADIOLOGY

## 2025-04-22 PROCEDURE — 76856 US EXAM PELVIC COMPLETE: CPT | Mod: TC | Performed by: RADIOLOGY

## 2025-04-24 ENCOUNTER — TELEPHONE (OUTPATIENT)
Dept: FAMILY MEDICINE | Facility: CLINIC | Age: 65
End: 2025-04-24

## 2025-04-24 NOTE — TELEPHONE ENCOUNTER
Patient Quality Outreach    Patient is due for the following:   Hypertension -  BP check    Action(s) Taken:   Schedule a nurse only visit for .bp check    Type of outreach:    Sent [employer] NOW message.    Questions for provider review:    None         Tatiana Rivas MA  Chart routed to None.

## 2025-07-15 ENCOUNTER — TELEPHONE (OUTPATIENT)
Dept: FAMILY MEDICINE | Facility: CLINIC | Age: 65
End: 2025-07-15

## 2025-07-15 NOTE — TELEPHONE ENCOUNTER
Patient Quality Outreach    Patient is due for the following:   Hypertension -  Hypertension follow-up visit    Action(s) Taken:   Patient has upcoming appointment, these items will be addressed at that time.    Type of outreach:    Chart review performed, no outreach needed.    Questions for provider review:    None         Yanna Islas MA  Chart routed to None.

## 2025-08-03 DIAGNOSIS — I10 ESSENTIAL HYPERTENSION WITH GOAL BLOOD PRESSURE LESS THAN 140/90: ICD-10-CM

## 2025-08-04 RX ORDER — LISINOPRIL 10 MG/1
15 TABLET ORAL DAILY
Qty: 90 TABLET | Refills: 3 | OUTPATIENT
Start: 2025-08-04

## 2025-08-11 ENCOUNTER — MYC REFILL (OUTPATIENT)
Dept: FAMILY MEDICINE | Facility: CLINIC | Age: 65
End: 2025-08-11
Payer: COMMERCIAL

## 2025-08-11 ENCOUNTER — NURSE TRIAGE (OUTPATIENT)
Dept: FAMILY MEDICINE | Facility: CLINIC | Age: 65
End: 2025-08-11
Payer: COMMERCIAL

## 2025-08-11 DIAGNOSIS — I10 ESSENTIAL HYPERTENSION WITH GOAL BLOOD PRESSURE LESS THAN 140/90: ICD-10-CM

## 2025-08-11 RX ORDER — LISINOPRIL 10 MG/1
15 TABLET ORAL DAILY
Qty: 90 TABLET | Refills: 3 | OUTPATIENT
Start: 2025-08-11

## 2025-08-11 RX ORDER — LISINOPRIL 10 MG/1
15 TABLET ORAL DAILY
Qty: 45 TABLET | Refills: 0 | Status: SHIPPED | OUTPATIENT
Start: 2025-08-11

## 2025-09-03 ENCOUNTER — MYC MEDICAL ADVICE (OUTPATIENT)
Dept: OTOLARYNGOLOGY | Facility: OTHER | Age: 65
End: 2025-09-03
Payer: COMMERCIAL

## 2025-09-03 DIAGNOSIS — F40.240 CLAUSTROPHOBIA: ICD-10-CM

## 2025-09-03 DIAGNOSIS — D35.2 PITUITARY ADENOMA (H): Primary | ICD-10-CM

## 2025-09-04 RX ORDER — DIAZEPAM 2 MG/1
TABLET ORAL
Qty: 2 TABLET | Refills: 0 | Status: SHIPPED | OUTPATIENT
Start: 2025-12-01

## (undated) DEVICE — ENDO SNARE EXACTO COLD 9MM LOOP 2.4MMX230CM 00711115

## (undated) RX ORDER — LIDOCAINE HYDROCHLORIDE 10 MG/ML
INJECTION, SOLUTION EPIDURAL; INFILTRATION; INTRACAUDAL; PERINEURAL
Status: DISPENSED
Start: 2023-04-10

## (undated) RX ORDER — LIDOCAINE HYDROCHLORIDE 10 MG/ML
INJECTION, SOLUTION INFILTRATION; PERINEURAL
Status: DISPENSED
Start: 2023-04-10